# Patient Record
Sex: MALE | Race: WHITE | Employment: FULL TIME | URBAN - METROPOLITAN AREA
[De-identification: names, ages, dates, MRNs, and addresses within clinical notes are randomized per-mention and may not be internally consistent; named-entity substitution may affect disease eponyms.]

---

## 2018-02-25 PROBLEM — J02.8 ACUTE PHARYNGITIS DUE TO OTHER SPECIFIED ORGANISMS: Status: ACTIVE | Noted: 2018-02-25

## 2018-03-12 ENCOUNTER — TELEPHONE (OUTPATIENT)
Dept: ADMINISTRATIVE | Age: 48
End: 2018-03-12

## 2018-04-12 ENCOUNTER — OFFICE VISIT (OUTPATIENT)
Dept: FAMILY MEDICINE CLINIC | Age: 48
End: 2018-04-12
Payer: COMMERCIAL

## 2018-04-12 VITALS
WEIGHT: 257 LBS | HEIGHT: 67 IN | DIASTOLIC BLOOD PRESSURE: 78 MMHG | RESPIRATION RATE: 16 BRPM | BODY MASS INDEX: 40.34 KG/M2 | HEART RATE: 93 BPM | TEMPERATURE: 98.5 F | SYSTOLIC BLOOD PRESSURE: 134 MMHG | OXYGEN SATURATION: 95 %

## 2018-04-12 DIAGNOSIS — Z87.898 H/O MOTION SICKNESS: ICD-10-CM

## 2018-04-12 DIAGNOSIS — E66.01 MORBID OBESITY WITH BMI OF 40.0-44.9, ADULT (HCC): ICD-10-CM

## 2018-04-12 DIAGNOSIS — I10 ESSENTIAL HYPERTENSION: Primary | ICD-10-CM

## 2018-04-12 PROCEDURE — G8417 CALC BMI ABV UP PARAM F/U: HCPCS | Performed by: NURSE PRACTITIONER

## 2018-04-12 PROCEDURE — 96160 PT-FOCUSED HLTH RISK ASSMT: CPT | Performed by: NURSE PRACTITIONER

## 2018-04-12 PROCEDURE — G8427 DOCREV CUR MEDS BY ELIG CLIN: HCPCS | Performed by: NURSE PRACTITIONER

## 2018-04-12 PROCEDURE — 99214 OFFICE O/P EST MOD 30 MIN: CPT | Performed by: NURSE PRACTITIONER

## 2018-04-12 PROCEDURE — 1036F TOBACCO NON-USER: CPT | Performed by: NURSE PRACTITIONER

## 2018-04-12 RX ORDER — SCOLOPAMINE TRANSDERMAL SYSTEM 1 MG/1
1 PATCH, EXTENDED RELEASE TRANSDERMAL
Qty: 3 PATCH | Refills: 0 | Status: SHIPPED | OUTPATIENT
Start: 2018-04-12 | End: 2019-04-08

## 2018-04-12 ASSESSMENT — PATIENT HEALTH QUESTIONNAIRE - PHQ9
6. FEELING BAD ABOUT YOURSELF - OR THAT YOU ARE A FAILURE OR HAVE LET YOURSELF OR YOUR FAMILY DOWN: 0
SUM OF ALL RESPONSES TO PHQ9 QUESTIONS 1 & 2: 4
3. TROUBLE FALLING OR STAYING ASLEEP: 1
SUM OF ALL RESPONSES TO PHQ QUESTIONS 1-9: 7
8. MOVING OR SPEAKING SO SLOWLY THAT OTHER PEOPLE COULD HAVE NOTICED. OR THE OPPOSITE, BEING SO FIGETY OR RESTLESS THAT YOU HAVE BEEN MOVING AROUND A LOT MORE THAN USUAL: 0
1. LITTLE INTEREST OR PLEASURE IN DOING THINGS: 3
4. FEELING TIRED OR HAVING LITTLE ENERGY: 2
7. TROUBLE CONCENTRATING ON THINGS, SUCH AS READING THE NEWSPAPER OR WATCHING TELEVISION: 0
2. FEELING DOWN, DEPRESSED OR HOPELESS: 1
5. POOR APPETITE OR OVEREATING: 0
9. THOUGHTS THAT YOU WOULD BE BETTER OFF DEAD, OR OF HURTING YOURSELF: 0
10. IF YOU CHECKED OFF ANY PROBLEMS, HOW DIFFICULT HAVE THESE PROBLEMS MADE IT FOR YOU TO DO YOUR WORK, TAKE CARE OF THINGS AT HOME, OR GET ALONG WITH OTHER PEOPLE: 0

## 2018-04-13 ENCOUNTER — TELEPHONE (OUTPATIENT)
Dept: FAMILY MEDICINE CLINIC | Age: 48
End: 2018-04-13

## 2018-04-15 ASSESSMENT — ENCOUNTER SYMPTOMS
SHORTNESS OF BREATH: 0
VOMITING: 0
SINUS PAIN: 0
ABDOMINAL PAIN: 0
EYE REDNESS: 0
CHEST TIGHTNESS: 0
SORE THROAT: 0
BLOOD IN STOOL: 0
CONSTIPATION: 0
COUGH: 0
EYE DISCHARGE: 0
COLOR CHANGE: 0
FACIAL SWELLING: 0
NAUSEA: 1
WHEEZING: 0
SINUS PRESSURE: 0
VOICE CHANGE: 0
EYE PAIN: 0
BACK PAIN: 0
TROUBLE SWALLOWING: 0
STRIDOR: 0
CHOKING: 0
EYE ITCHING: 0
ANAL BLEEDING: 0
PHOTOPHOBIA: 0
RHINORRHEA: 0
APNEA: 0
ABDOMINAL DISTENTION: 0
DIARRHEA: 0
RECTAL PAIN: 0

## 2018-04-23 ENCOUNTER — TELEPHONE (OUTPATIENT)
Dept: FAMILY MEDICINE CLINIC | Age: 48
End: 2018-04-23

## 2018-05-14 ENCOUNTER — OFFICE VISIT (OUTPATIENT)
Dept: FAMILY MEDICINE CLINIC | Age: 48
End: 2018-05-14
Payer: COMMERCIAL

## 2018-05-14 VITALS
HEART RATE: 88 BPM | TEMPERATURE: 98.5 F | DIASTOLIC BLOOD PRESSURE: 80 MMHG | WEIGHT: 259 LBS | BODY MASS INDEX: 40.65 KG/M2 | SYSTOLIC BLOOD PRESSURE: 133 MMHG | HEIGHT: 67 IN | RESPIRATION RATE: 20 BRPM

## 2018-05-14 DIAGNOSIS — H92.01 OTALGIA, RIGHT: Primary | ICD-10-CM

## 2018-05-14 PROCEDURE — 1036F TOBACCO NON-USER: CPT | Performed by: NURSE PRACTITIONER

## 2018-05-14 PROCEDURE — G8427 DOCREV CUR MEDS BY ELIG CLIN: HCPCS | Performed by: NURSE PRACTITIONER

## 2018-05-14 PROCEDURE — 99213 OFFICE O/P EST LOW 20 MIN: CPT | Performed by: NURSE PRACTITIONER

## 2018-05-14 PROCEDURE — G8417 CALC BMI ABV UP PARAM F/U: HCPCS | Performed by: NURSE PRACTITIONER

## 2018-05-14 RX ORDER — PSEUDOEPHEDRINE HYDROCHLORIDE 30 MG/1
TABLET ORAL
Qty: 10 TABLET | Refills: 0 | Status: SHIPPED | OUTPATIENT
Start: 2018-05-14 | End: 2019-01-30 | Stop reason: SDUPTHER

## 2018-05-21 ENCOUNTER — OFFICE VISIT (OUTPATIENT)
Dept: FAMILY MEDICINE CLINIC | Age: 48
End: 2018-05-21
Payer: COMMERCIAL

## 2018-05-21 VITALS
RESPIRATION RATE: 16 BRPM | HEART RATE: 94 BPM | OXYGEN SATURATION: 95 % | HEIGHT: 67 IN | TEMPERATURE: 98.5 F | DIASTOLIC BLOOD PRESSURE: 88 MMHG | WEIGHT: 258 LBS | SYSTOLIC BLOOD PRESSURE: 133 MMHG | BODY MASS INDEX: 40.49 KG/M2

## 2018-05-21 DIAGNOSIS — H92.01 OTALGIA, RIGHT EAR: Primary | ICD-10-CM

## 2018-05-21 PROCEDURE — 1036F TOBACCO NON-USER: CPT | Performed by: NURSE PRACTITIONER

## 2018-05-21 PROCEDURE — G8417 CALC BMI ABV UP PARAM F/U: HCPCS | Performed by: NURSE PRACTITIONER

## 2018-05-21 PROCEDURE — G8427 DOCREV CUR MEDS BY ELIG CLIN: HCPCS | Performed by: NURSE PRACTITIONER

## 2018-05-21 PROCEDURE — 99213 OFFICE O/P EST LOW 20 MIN: CPT | Performed by: NURSE PRACTITIONER

## 2018-05-21 ASSESSMENT — ENCOUNTER SYMPTOMS
FACIAL SWELLING: 0
TROUBLE SWALLOWING: 0
WHEEZING: 0
RHINORRHEA: 0
SINUS PAIN: 0
COLOR CHANGE: 0
ABDOMINAL PAIN: 0
SORE THROAT: 0
WHEEZING: 0
PHOTOPHOBIA: 0
PHOTOPHOBIA: 0
SORE THROAT: 0
VOICE CHANGE: 0
ABDOMINAL PAIN: 0
EYE PAIN: 0
EYE ITCHING: 0
SHORTNESS OF BREATH: 0
FACIAL SWELLING: 0
EYE REDNESS: 0
SINUS PRESSURE: 0
SHORTNESS OF BREATH: 0
DIARRHEA: 0
SINUS PAIN: 0
EYE REDNESS: 0
SINUS PRESSURE: 0
VOMITING: 0
VOMITING: 0
TROUBLE SWALLOWING: 0
COUGH: 0
RHINORRHEA: 0
EYE ITCHING: 0
NAUSEA: 0
VOICE CHANGE: 0
NAUSEA: 0
DIARRHEA: 0
COUGH: 0
EYE PAIN: 0
COLOR CHANGE: 0

## 2018-06-13 ENCOUNTER — HOSPITAL ENCOUNTER (OUTPATIENT)
Age: 48
Discharge: HOME | End: 2018-06-13
Payer: COMMERCIAL

## 2018-06-13 DIAGNOSIS — E66.01 MORBID OBESITY WITH BMI OF 40.0-44.9, ADULT (HCC): ICD-10-CM

## 2018-06-13 DIAGNOSIS — I10 ESSENTIAL HYPERTENSION: ICD-10-CM

## 2018-06-13 LAB
ALBUMIN SERPL-MCNC: 4.1 G/DL (ref 3.5–5.2)
ALP BLD-CCNC: 99 U/L (ref 40–129)
ALT SERPL-CCNC: 20 U/L (ref 0–40)
ANION GAP SERPL CALCULATED.3IONS-SCNC: 13 MMOL/L (ref 7–16)
AST SERPL-CCNC: 19 U/L (ref 0–39)
BILIRUB SERPL-MCNC: 0.5 MG/DL (ref 0–1.2)
BUN BLDV-MCNC: 13 MG/DL (ref 6–20)
CALCIUM SERPL-MCNC: 9.6 MG/DL (ref 8.6–10.2)
CHLORIDE BLD-SCNC: 103 MMOL/L (ref 98–107)
CHOLESTEROL, TOTAL: 178 MG/DL (ref 0–199)
CO2: 28 MMOL/L (ref 22–29)
CREAT SERPL-MCNC: 0.8 MG/DL (ref 0.7–1.2)
GFR AFRICAN AMERICAN: >60
GFR NON-AFRICAN AMERICAN: >60 ML/MIN/1.73
GLUCOSE BLD-MCNC: 101 MG/DL (ref 74–109)
HCT VFR BLD CALC: 44.2 % (ref 37–54)
HDLC SERPL-MCNC: 40 MG/DL
HEMOGLOBIN: 14.9 G/DL (ref 12.5–16.5)
LDL CHOLESTEROL CALCULATED: 122 MG/DL (ref 0–99)
MCH RBC QN AUTO: 27.1 PG (ref 26–35)
MCHC RBC AUTO-ENTMCNC: 33.7 % (ref 32–34.5)
MCV RBC AUTO: 80.5 FL (ref 80–99.9)
PDW BLD-RTO: 13.1 FL (ref 11.5–15)
PLATELET # BLD: 302 E9/L (ref 130–450)
PMV BLD AUTO: 8.9 FL (ref 7–12)
POTASSIUM SERPL-SCNC: 3.7 MMOL/L (ref 3.5–5)
RBC # BLD: 5.49 E12/L (ref 3.8–5.8)
SODIUM BLD-SCNC: 144 MMOL/L (ref 132–146)
TOTAL PROTEIN: 7.5 G/DL (ref 6.4–8.3)
TRIGL SERPL-MCNC: 82 MG/DL (ref 0–149)
TSH SERPL DL<=0.05 MIU/L-ACNC: 4.63 UIU/ML (ref 0.27–4.2)
VLDLC SERPL CALC-MCNC: 16 MG/DL
WBC # BLD: 9.3 E9/L (ref 4.5–11.5)

## 2018-06-13 PROCEDURE — 80053 COMPREHEN METABOLIC PANEL: CPT

## 2018-06-13 PROCEDURE — 85027 COMPLETE CBC AUTOMATED: CPT

## 2018-06-13 PROCEDURE — 84443 ASSAY THYROID STIM HORMONE: CPT

## 2018-06-13 PROCEDURE — 36415 COLL VENOUS BLD VENIPUNCTURE: CPT

## 2018-06-13 PROCEDURE — 80061 LIPID PANEL: CPT

## 2018-06-18 ENCOUNTER — TELEPHONE (OUTPATIENT)
Dept: FAMILY MEDICINE CLINIC | Age: 48
End: 2018-06-18

## 2018-06-25 DIAGNOSIS — R79.89 ABNORMAL THYROID BLOOD TEST: Primary | ICD-10-CM

## 2018-08-06 ENCOUNTER — HOSPITAL ENCOUNTER (OUTPATIENT)
Age: 48
Discharge: HOME | End: 2018-08-06
Payer: COMMERCIAL

## 2018-08-06 DIAGNOSIS — R79.89 ABNORMAL THYROID BLOOD TEST: ICD-10-CM

## 2018-08-06 LAB — TSH SERPL DL<=0.05 MIU/L-ACNC: 4.37 UIU/ML (ref 0.27–4.2)

## 2018-08-06 PROCEDURE — 84443 ASSAY THYROID STIM HORMONE: CPT

## 2018-08-06 PROCEDURE — 36415 COLL VENOUS BLD VENIPUNCTURE: CPT

## 2018-08-07 RX ORDER — LEVOTHYROXINE SODIUM 0.03 MG/1
25 TABLET ORAL DAILY
Qty: 30 TABLET | Refills: 0 | Status: SHIPPED | OUTPATIENT
Start: 2018-08-07 | End: 2018-08-29 | Stop reason: SDUPTHER

## 2018-08-30 RX ORDER — LEVOTHYROXINE SODIUM 0.03 MG/1
25 TABLET ORAL DAILY
Qty: 30 TABLET | Refills: 0 | Status: SHIPPED | OUTPATIENT
Start: 2018-08-30 | End: 2018-09-11 | Stop reason: SDUPTHER

## 2018-09-10 ENCOUNTER — HOSPITAL ENCOUNTER (OUTPATIENT)
Age: 48
Discharge: HOME | End: 2018-09-12
Payer: COMMERCIAL

## 2018-09-10 ENCOUNTER — OFFICE VISIT (OUTPATIENT)
Dept: FAMILY MEDICINE CLINIC | Age: 48
End: 2018-09-10
Payer: COMMERCIAL

## 2018-09-10 VITALS
OXYGEN SATURATION: 97 % | BODY MASS INDEX: 40.97 KG/M2 | SYSTOLIC BLOOD PRESSURE: 138 MMHG | RESPIRATION RATE: 14 BRPM | HEART RATE: 84 BPM | DIASTOLIC BLOOD PRESSURE: 84 MMHG | TEMPERATURE: 98.7 F | HEIGHT: 67 IN | WEIGHT: 261 LBS

## 2018-09-10 DIAGNOSIS — I10 ESSENTIAL HYPERTENSION: Primary | ICD-10-CM

## 2018-09-10 DIAGNOSIS — E03.9 HYPOTHYROIDISM, UNSPECIFIED TYPE: ICD-10-CM

## 2018-09-10 PROBLEM — R79.89 ABNORMAL THYROID BLOOD TEST: Status: RESOLVED | Noted: 2018-06-25 | Resolved: 2018-09-10

## 2018-09-10 PROBLEM — J02.8 ACUTE PHARYNGITIS DUE TO OTHER SPECIFIED ORGANISMS: Status: RESOLVED | Noted: 2018-02-25 | Resolved: 2018-09-10

## 2018-09-10 LAB — TSH SERPL DL<=0.05 MIU/L-ACNC: 2.26 UIU/ML (ref 0.27–4.2)

## 2018-09-10 PROCEDURE — G8427 DOCREV CUR MEDS BY ELIG CLIN: HCPCS | Performed by: NURSE PRACTITIONER

## 2018-09-10 PROCEDURE — G8417 CALC BMI ABV UP PARAM F/U: HCPCS | Performed by: NURSE PRACTITIONER

## 2018-09-10 PROCEDURE — 84443 ASSAY THYROID STIM HORMONE: CPT

## 2018-09-10 PROCEDURE — 99214 OFFICE O/P EST MOD 30 MIN: CPT | Performed by: NURSE PRACTITIONER

## 2018-09-10 PROCEDURE — 1036F TOBACCO NON-USER: CPT | Performed by: NURSE PRACTITIONER

## 2018-09-10 ASSESSMENT — ENCOUNTER SYMPTOMS
COLOR CHANGE: 0
CONSTIPATION: 0
VOMITING: 0
TROUBLE SWALLOWING: 0
VOICE CHANGE: 0
ABDOMINAL DISTENTION: 0
DIARRHEA: 0
ABDOMINAL PAIN: 0
NAUSEA: 0
SHORTNESS OF BREATH: 0
EYE PAIN: 0
WHEEZING: 0
CHEST TIGHTNESS: 0
COUGH: 0
BLOOD IN STOOL: 0
PHOTOPHOBIA: 0

## 2018-09-10 NOTE — PATIENT INSTRUCTIONS
Take your usual dose the next day. · Tell your doctor about all prescription, herbal, or over-the-counter products you take. · Take care of yourself. Eat a healthy diet, get enough sleep, and get regular exercise. When should you call for help? Call 911 anytime you think you may need emergency care. For example, call if:    · You passed out (lost consciousness).     · You have severe trouble breathing.     · You have a very slow heartbeat (less than 60 beats a minute).     · You have a low body temperature (95°F or below).    Call your doctor now or seek immediate medical care if:    · You feel tired, sluggish, or weak.     · You have trouble remembering things or concentrating.     · You do not begin to feel better 2 weeks after starting your medicine.    Watch closely for changes in your health, and be sure to contact your doctor if you have any problems. Where can you learn more? Go to https://[employer].iCracked. org and sign in to your [employer] account. Enter P544 in the myBarrister box to learn more about \"Hypothyroidism: Care Instructions. \"     If you do not have an account, please click on the \"Sign Up Now\" link. Current as of: May 12, 2017  Content Version: 11.7  © 2940-7115 [employer]. Care instructions adapted under license by [facility] ([facility]). If you have questions about a medical condition or this instruction, always ask your healthcare professional. [place] [address] any warranty or liability for your use of this information.

## 2018-09-11 RX ORDER — LEVOTHYROXINE SODIUM 0.03 MG/1
25 TABLET ORAL DAILY
Qty: 30 TABLET | Refills: 5 | Status: SHIPPED | OUTPATIENT
Start: 2018-09-11 | End: 2019-04-01 | Stop reason: SDUPTHER

## 2018-12-10 DIAGNOSIS — I10 ESSENTIAL HYPERTENSION: ICD-10-CM

## 2018-12-10 RX ORDER — AMLODIPINE BESYLATE 5 MG/1
TABLET ORAL
Qty: 30 TABLET | Refills: 0 | Status: SHIPPED | OUTPATIENT
Start: 2018-12-10 | End: 2019-01-16

## 2019-01-30 DIAGNOSIS — H92.01 OTALGIA, RIGHT: ICD-10-CM

## 2019-01-30 RX ORDER — PSEUDOEPHEDRINE HYDROCHLORIDE 30 MG/1
TABLET ORAL
Qty: 30 TABLET | Refills: 3 | Status: SHIPPED | OUTPATIENT
Start: 2019-01-30 | End: 2019-04-26 | Stop reason: ALTCHOICE

## 2019-04-02 RX ORDER — LEVOTHYROXINE SODIUM 0.03 MG/1
25 TABLET ORAL DAILY
Qty: 30 TABLET | Refills: 5 | Status: SHIPPED | OUTPATIENT
Start: 2019-04-02 | End: 2019-05-31 | Stop reason: SDUPTHER

## 2019-04-08 ENCOUNTER — HOSPITAL ENCOUNTER (OUTPATIENT)
Age: 49
Discharge: HOME | End: 2019-04-08
Payer: COMMERCIAL

## 2019-04-08 ENCOUNTER — OFFICE VISIT (OUTPATIENT)
Dept: FAMILY MEDICINE CLINIC | Age: 49
End: 2019-04-08
Payer: COMMERCIAL

## 2019-04-08 VITALS
OXYGEN SATURATION: 97 % | TEMPERATURE: 98.4 F | HEIGHT: 67 IN | RESPIRATION RATE: 16 BRPM | SYSTOLIC BLOOD PRESSURE: 134 MMHG | DIASTOLIC BLOOD PRESSURE: 86 MMHG | WEIGHT: 262 LBS | HEART RATE: 80 BPM | BODY MASS INDEX: 41.12 KG/M2

## 2019-04-08 DIAGNOSIS — Z80.42 FAMILY HISTORY OF PROSTATE CANCER IN FATHER: ICD-10-CM

## 2019-04-08 DIAGNOSIS — R07.9 NONSPECIFIC CHEST PAIN: ICD-10-CM

## 2019-04-08 DIAGNOSIS — E03.9 HYPOTHYROIDISM, UNSPECIFIED TYPE: ICD-10-CM

## 2019-04-08 DIAGNOSIS — R10.13 EPIGASTRIC PAIN: ICD-10-CM

## 2019-04-08 DIAGNOSIS — I10 ESSENTIAL HYPERTENSION: ICD-10-CM

## 2019-04-08 LAB
ALBUMIN SERPL-MCNC: 4.3 G/DL (ref 3.5–5.2)
ALP BLD-CCNC: 136 U/L (ref 40–129)
ALT SERPL-CCNC: 143 U/L (ref 0–40)
AMYLASE: 38 U/L (ref 20–100)
ANION GAP SERPL CALCULATED.3IONS-SCNC: 13 MMOL/L (ref 7–16)
AST SERPL-CCNC: 65 U/L (ref 0–39)
BASOPHILS ABSOLUTE: 0.1 E9/L (ref 0–0.2)
BASOPHILS RELATIVE PERCENT: 1.2 % (ref 0–2)
BILIRUB SERPL-MCNC: 0.5 MG/DL (ref 0–1.2)
BUN BLDV-MCNC: 10 MG/DL (ref 6–20)
CALCIUM SERPL-MCNC: 9.4 MG/DL (ref 8.6–10.2)
CHLORIDE BLD-SCNC: 103 MMOL/L (ref 98–107)
CHOLESTEROL, TOTAL: 177 MG/DL (ref 0–199)
CO2: 28 MMOL/L (ref 22–29)
CREAT SERPL-MCNC: 0.8 MG/DL (ref 0.7–1.2)
EOSINOPHILS ABSOLUTE: 0.46 E9/L (ref 0.05–0.5)
EOSINOPHILS RELATIVE PERCENT: 5.7 % (ref 0–6)
GFR AFRICAN AMERICAN: >60
GFR NON-AFRICAN AMERICAN: >60 ML/MIN/1.73
GLUCOSE BLD-MCNC: 100 MG/DL (ref 74–99)
HCT VFR BLD CALC: 46 % (ref 37–54)
HDLC SERPL-MCNC: 46 MG/DL
HEMOGLOBIN: 15.3 G/DL (ref 12.5–16.5)
IMMATURE GRANULOCYTES #: 0.04 E9/L
IMMATURE GRANULOCYTES %: 0.5 % (ref 0–5)
LDL CHOLESTEROL CALCULATED: 110 MG/DL (ref 0–99)
LIPASE: 54 U/L (ref 13–60)
LYMPHOCYTES ABSOLUTE: 2.45 E9/L (ref 1.5–4)
LYMPHOCYTES RELATIVE PERCENT: 30.2 % (ref 20–42)
MCH RBC QN AUTO: 27.4 PG (ref 26–35)
MCHC RBC AUTO-ENTMCNC: 33.3 % (ref 32–34.5)
MCV RBC AUTO: 82.3 FL (ref 80–99.9)
MONOCYTES ABSOLUTE: 0.45 E9/L (ref 0.1–0.95)
MONOCYTES RELATIVE PERCENT: 5.5 % (ref 2–12)
NEUTROPHILS ABSOLUTE: 4.62 E9/L (ref 1.8–7.3)
NEUTROPHILS RELATIVE PERCENT: 56.9 % (ref 43–80)
PDW BLD-RTO: 13.7 FL (ref 11.5–15)
PLATELET # BLD: 289 E9/L (ref 130–450)
PMV BLD AUTO: 8.9 FL (ref 7–12)
POTASSIUM SERPL-SCNC: 3.4 MMOL/L (ref 3.5–5)
PROSTATE SPECIFIC ANTIGEN: 0.43 NG/ML (ref 0–4)
RBC # BLD: 5.59 E12/L (ref 3.8–5.8)
SODIUM BLD-SCNC: 144 MMOL/L (ref 132–146)
TOTAL CK: 94 U/L (ref 20–200)
TOTAL PROTEIN: 7.9 G/DL (ref 6.4–8.3)
TRIGL SERPL-MCNC: 104 MG/DL (ref 0–149)
TROPONIN: <0.01 NG/ML (ref 0–0.03)
TSH SERPL DL<=0.05 MIU/L-ACNC: 4.27 UIU/ML (ref 0.27–4.2)
VLDLC SERPL CALC-MCNC: 21 MG/DL
WBC # BLD: 8.1 E9/L (ref 4.5–11.5)

## 2019-04-08 PROCEDURE — G8417 CALC BMI ABV UP PARAM F/U: HCPCS | Performed by: NURSE PRACTITIONER

## 2019-04-08 PROCEDURE — 36415 COLL VENOUS BLD VENIPUNCTURE: CPT

## 2019-04-08 PROCEDURE — G0103 PSA SCREENING: HCPCS

## 2019-04-08 PROCEDURE — 83690 ASSAY OF LIPASE: CPT

## 2019-04-08 PROCEDURE — 84443 ASSAY THYROID STIM HORMONE: CPT

## 2019-04-08 PROCEDURE — 93000 ELECTROCARDIOGRAM COMPLETE: CPT | Performed by: NURSE PRACTITIONER

## 2019-04-08 PROCEDURE — 82550 ASSAY OF CK (CPK): CPT

## 2019-04-08 PROCEDURE — 85025 COMPLETE CBC W/AUTO DIFF WBC: CPT

## 2019-04-08 PROCEDURE — 99214 OFFICE O/P EST MOD 30 MIN: CPT | Performed by: NURSE PRACTITIONER

## 2019-04-08 PROCEDURE — 1036F TOBACCO NON-USER: CPT | Performed by: NURSE PRACTITIONER

## 2019-04-08 PROCEDURE — 84484 ASSAY OF TROPONIN QUANT: CPT

## 2019-04-08 PROCEDURE — G8427 DOCREV CUR MEDS BY ELIG CLIN: HCPCS | Performed by: NURSE PRACTITIONER

## 2019-04-08 PROCEDURE — 80061 LIPID PANEL: CPT

## 2019-04-08 PROCEDURE — 80053 COMPREHEN METABOLIC PANEL: CPT

## 2019-04-08 PROCEDURE — 82150 ASSAY OF AMYLASE: CPT

## 2019-04-08 ASSESSMENT — ENCOUNTER SYMPTOMS
WHEEZING: 0
DIARRHEA: 0
COUGH: 0
EYE ITCHING: 0
EYE PAIN: 0
CHOKING: 0
CHEST TIGHTNESS: 0
APNEA: 0
VOMITING: 0
NAUSEA: 0
EYE REDNESS: 0
TROUBLE SWALLOWING: 0
ABDOMINAL DISTENTION: 0
ABDOMINAL PAIN: 1
SINUS PAIN: 0
VOICE CHANGE: 0
COLOR CHANGE: 0
STRIDOR: 0
SINUS PRESSURE: 0
BACK PAIN: 0
RHINORRHEA: 0
SHORTNESS OF BREATH: 0
BLOOD IN STOOL: 0
PHOTOPHOBIA: 0
SORE THROAT: 0
CONSTIPATION: 0
EYE DISCHARGE: 0

## 2019-04-08 ASSESSMENT — PATIENT HEALTH QUESTIONNAIRE - PHQ9
SUM OF ALL RESPONSES TO PHQ9 QUESTIONS 1 & 2: 0
SUM OF ALL RESPONSES TO PHQ QUESTIONS 1-9: 0
1. LITTLE INTEREST OR PLEASURE IN DOING THINGS: 0
SUM OF ALL RESPONSES TO PHQ QUESTIONS 1-9: 0
2. FEELING DOWN, DEPRESSED OR HOPELESS: 0

## 2019-04-08 NOTE — PROGRESS NOTES
Deepali Cormier is a 53 y.o. male who presents today for   Chief Complaint   Patient presents with    Abdominal Pain     over the weekend, has subsided now     Chest Pain     resolved    Hypertension    Hypothyroidism         HPI    Abdominal/Chest Pain  Pt presents today with c/o epigastric pain/pressure and episode of chest pain that started over the weekend. Pt denies any radiating pain or N/V/D, bloody/tarry stools, abnormal weight loss, dysphagia. Pt is tolerating PO well and is afebrile. Pt feels he may of had gastritis, advised pt will need EKG and labs today. Pt stated he was not doing any physical activity when pain started. He does not recall eating any fried/spicy or fatty foods, denies h/o gallbladder issues    Hypertension  Patient is here for follow-up of elevated blood pressure. He is exercising and is adherent to a low-salt diet. Blood pressure is well controlled at home. Cardiac symptoms: chest pain. Patient denies chest pressure/discomfort, claudication, dyspnea, exertional chest pressure/discomfort, fatigue, irregular heart beat, lower extremity edema, near-syncope, orthopnea, palpitations, paroxysmal nocturnal dyspnea and syncope. Cardiovascular risk factors: hypertension, male gender and obesity (BMI >= 30 kg/m2). Use of agents associated with hypertension: thyroid hormones. Hypothyroidism: Recent symptoms: none. He denies weight gain, weight loss, cold intolerance, heat intolerance, anxiety, tremor, palpitations and dysphagia. Patient is  taking his medication consistently on an empty stomach. Lab Results   Component Value Date    TSH 2.260 09/10/2018    TSH 4.370 (H) 08/06/2018    TSH 4.630 (H) 06/13/2018     Pt is due for Preventative labs today    [facility]:  Patient's past medical, surgical, social and/or family history reviewed, updated in chart, and are non-contributory (unless otherwise stated). Medications and allergies also reviewed and updated in chart.       Review of Systems  Review of Systems   Constitutional: Negative for activity change, appetite change, chills, diaphoresis, fatigue, fever and unexpected weight change. HENT: Negative for congestion, ear discharge, ear pain, hearing loss, mouth sores, nosebleeds, postnasal drip, rhinorrhea, sinus pressure, sinus pain, sneezing, sore throat, tinnitus, trouble swallowing and voice change. Eyes: Negative for photophobia, pain, discharge, redness, itching and visual disturbance. Respiratory: Negative for apnea, cough, choking, chest tightness, shortness of breath, wheezing and stridor. Cardiovascular: Positive for chest pain. Negative for palpitations and leg swelling. Gastrointestinal: Positive for abdominal pain (epigastric ). Negative for abdominal distention, blood in stool, constipation, diarrhea, nausea and vomiting. Endocrine: Negative for cold intolerance, heat intolerance, polydipsia, polyphagia and polyuria. Genitourinary: Negative for decreased urine volume, difficulty urinating, enuresis, flank pain, frequency, hematuria and urgency. Musculoskeletal: Negative for arthralgias, back pain, joint swelling and myalgias. Skin: Negative for color change, pallor and rash. Neurological: Negative for dizziness, tremors, seizures, syncope, facial asymmetry, speech difficulty, weakness, light-headedness, numbness and headaches. Hematological: Negative for adenopathy. Does not bruise/bleed easily. Physical Exam:    VS:  Pulse 80   Temp 98.4 °F (36.9 °C) (Oral)   Resp 16   Ht 5' 7\" (1.702 m)   Wt 262 lb (118.8 kg)   SpO2 97%   BMI 41.04 kg/m²   LAST WEIGHT:  Wt Readings from Last 3 Encounters:   04/08/19 262 lb (118.8 kg)   09/10/18 261 lb (118.4 kg)   05/21/18 258 lb (117 kg)     Physical Exam   Constitutional: He is oriented to person, place, and time. He appears well-developed and well-nourished. No distress. HENT:   Head: Normocephalic and atraumatic.    Right Ear: External ear normal.   Left Ear: External ear normal.   Nose: Nose normal.   Mouth/Throat: Oropharynx is clear and moist. No oropharyngeal exudate. Eyes: Pupils are equal, round, and reactive to light. Conjunctivae and EOM are normal. Right eye exhibits no discharge. Left eye exhibits no discharge. Neck: Normal range of motion. Neck supple. No JVD present. No thyromegaly present. Cardiovascular: Normal rate, regular rhythm, normal heart sounds and intact distal pulses. Exam reveals no friction rub. No murmur heard. No peripheral edema   Pulmonary/Chest: Effort normal and breath sounds normal. No stridor. No respiratory distress. He has no wheezes. He has no rales. He exhibits no tenderness. Pulse ox 97%   Abdominal: Soft. Bowel sounds are normal. He exhibits no distension and no mass. There is tenderness (mild epigastric pain). There is no rebound and no guarding. No hernia. Musculoskeletal: Normal range of motion. He exhibits no edema, tenderness or deformity. Lymphadenopathy:     He has no cervical adenopathy. Neurological: He is alert and oriented to person, place, and time. He displays normal reflexes. No cranial nerve deficit or sensory deficit. He exhibits normal muscle tone. Coordination normal.   Skin: Skin is warm and dry. No rash noted. He is not diaphoretic. No erythema. No pallor. Nursing note and vitals reviewed. Assessment / Plan:      Tyra Méndez was seen today for abdominal pain, chest pain, hypertension and hypothyroidism. Diagnoses and all orders for this visit:    Essential hypertension-stable/controlled  Continue current treatment plan  -     CBC Auto Differential; Future  -     Comprehensive Metabolic Panel; Future  -     TSH without Reflex; Future  -     Lipid Panel; Future    Hypothyroidism, unspecified type  Continue current dosage of Synthroid  -     CBC Auto Differential; Future  -     Comprehensive Metabolic Panel; Future  -     TSH without Reflex;  Future  -     Lipid Panel; Future    Epigastric pain  Further treatment plan will be based on labs once received  -     EKG 12 Lead  -     CBC Auto Differential; Future  -     Comprehensive Metabolic Panel; Future  -     AMYLASE; Future  -     LIPASE; Future    Nonspecific chest pain  Advised to have labs completed today  Advised to go to ED immediately with any worsening chest pain as discussed  -     EKG 12 Lead  -     TROPONIN I; Future  -     CK; Future    Family history of prostate cancer in father  -     PSA SCREENING; Future         Call or go to ED immediately if symptoms worsen or persist.    Return for f/u will be based on diagnostic testing., or sooner if necessary. Educational materials and/or home exercises printed for patient's review and were included in patient instructions on his/her After Visit Summary and given to patient at the end of visit. Counseled regarding above diagnosis, including possible risks and complications,  especially if left uncontrolled. Counseled regarding the possible side effects, risks, benefits and alternatives to treatment; patient and/or guardian verbalizes understanding, agrees, feels comfortable with and wishes to proceed with above treatment plan. Advised patient to call with any new medication issues, and read all Rx info from pharmacy to assure aware of all possible risks and side effects of medication before taking. Reviewed age and gender appropriate health screening exams and vaccinations. Advised patient regarding importance of keeping up with recommended health maintenance and to schedule as soon as possible if overdue, as this is important in assessing for undiagnosed pathology, especially cancer, as well as protecting against potentially harmful/life threatening disease. Patient and/or guardian verbalizes understanding and agrees with above counseling, assessment and plan. All questions answered.     Samantha Eaton APRN - CNP

## 2019-04-09 ENCOUNTER — TELEPHONE (OUTPATIENT)
Dept: FAMILY MEDICINE CLINIC | Age: 49
End: 2019-04-09

## 2019-04-09 DIAGNOSIS — R74.8 ELEVATED LIVER ENZYMES: ICD-10-CM

## 2019-04-09 DIAGNOSIS — E03.9 HYPOTHYROIDISM, UNSPECIFIED TYPE: Primary | ICD-10-CM

## 2019-04-09 NOTE — TELEPHONE ENCOUNTER
Spoke to patient in detail regarding his recent lab results, advised cardiac enzymes were normal, slightly elevated TSH and liver enzymes which can be elevated from a possible recent viral infection-gastroenteritis. Patient is agreeable to have US of gallbladder done at this time and will have repeat blood work done in two weeks for liver function and TSH. Advised he should go to [facility] for lab work and to wait for call regarding scheduling of US of gallbladder.

## 2019-04-11 DIAGNOSIS — R10.11 RIGHT UPPER QUADRANT PAIN: Primary | ICD-10-CM

## 2019-04-16 ENCOUNTER — HOSPITAL ENCOUNTER (OUTPATIENT)
Dept: ULTRASOUND IMAGING | Age: 49
Discharge: HOME | End: 2019-04-18
Payer: COMMERCIAL

## 2019-04-16 DIAGNOSIS — R10.11 RIGHT UPPER QUADRANT PAIN: ICD-10-CM

## 2019-04-16 DIAGNOSIS — R10.11 RIGHT UPPER QUADRANT PAIN: Primary | ICD-10-CM

## 2019-04-16 DIAGNOSIS — K80.20 GALLSTONES: ICD-10-CM

## 2019-04-16 PROCEDURE — 76705 ECHO EXAM OF ABDOMEN: CPT

## 2019-04-17 ENCOUNTER — TELEPHONE (OUTPATIENT)
Dept: ADMINISTRATIVE | Age: 49
End: 2019-04-17

## 2019-04-17 NOTE — TELEPHONE ENCOUNTER
Spoke with the patient on the phone. The patient can come to [facility] office on 4/25/19. Scheduled the patient for the office visit on 4/25/19 at 9:45am, bring ID, medication list, insurance card and co-pay. Gave the direction to the clinic. The patient verbalized understanding. MA then tentatively scheduled lap viral on 4/29/19 at 10:00am in [facility]. Spoke with KURT Garcia RN. Told Radha to call the patient after 4/25/19 afternoon. Jules Esqueda verbalized understanding. Forwarded message to Dr. Sonia Morin for informational purposes.   Electronically signed by Ronnell Quan on 4/17/19 at 11:17 AM

## 2019-04-24 ENCOUNTER — PREP FOR PROCEDURE (OUTPATIENT)
Dept: SURGERY | Age: 49
End: 2019-04-24

## 2019-04-24 RX ORDER — SODIUM CHLORIDE 0.9 % (FLUSH) 0.9 %
10 SYRINGE (ML) INJECTION PRN
Status: CANCELLED | OUTPATIENT
Start: 2019-04-24

## 2019-04-24 RX ORDER — SODIUM CHLORIDE 0.9 % (FLUSH) 0.9 %
10 SYRINGE (ML) INJECTION EVERY 12 HOURS SCHEDULED
Status: CANCELLED | OUTPATIENT
Start: 2019-04-24

## 2019-04-25 ENCOUNTER — TELEPHONE (OUTPATIENT)
Dept: SURGERY | Age: 49
End: 2019-04-25

## 2019-04-25 ENCOUNTER — OFFICE VISIT (OUTPATIENT)
Dept: SURGERY | Age: 49
End: 2019-04-25
Payer: COMMERCIAL

## 2019-04-25 VITALS
BODY MASS INDEX: 41.28 KG/M2 | HEART RATE: 86 BPM | TEMPERATURE: 98.2 F | SYSTOLIC BLOOD PRESSURE: 167 MMHG | RESPIRATION RATE: 18 BRPM | WEIGHT: 263 LBS | DIASTOLIC BLOOD PRESSURE: 103 MMHG | HEIGHT: 67 IN

## 2019-04-25 DIAGNOSIS — K80.20 CALCULUS OF GALLBLADDER WITHOUT CHOLECYSTITIS WITHOUT OBSTRUCTION: Primary | ICD-10-CM

## 2019-04-25 PROCEDURE — 99203 OFFICE O/P NEW LOW 30 MIN: CPT | Performed by: SURGERY

## 2019-04-25 NOTE — TELEPHONE ENCOUNTER
Spoke with [facility] representative [address] [facility]. Per [facility] [facility], [facility] [place] does not require the prior authorization. Tong Tafoya stated the patient's insurance will be  12 am on 19. Attempted to call the patient, no answer, left message on the phone.   Electronically signed by Adilson Nelson on 19 at 3:40 PM

## 2019-04-25 NOTE — PROGRESS NOTES
[facility] Surgery   History and Physical    Patient's Name/Date of Birth: Keila Ttaum / 1970 (43 y.o.)    Date: April 25, 2019     PCP: JOHN PAUL Iqbal CNP    Chief Complaint:   Chief Complaint   Patient presents with    Cholelithiasis       History of Present Illness: RUQ pain acute with radiation to back, associated with PO intake, recently began having sx. Non smoker some ETOH, denies others. No previous surgical hx. Past Medical History:   Diagnosis Date    Hypertension        Past Surgical History:   Procedure Laterality Date    WISDOM TOOTH EXTRACTION         Family History   Problem Relation Age of Onset    Cancer Father         Prostate    Stroke Maternal Grandfather     Cancer Paternal Uncle         Prostate    Cancer Paternal Uncle         Prostate       Social History     Socioeconomic History    Marital status:      Spouse name: Not on file    Number of children: Not on file    Years of education: Not on file    Highest education level: Not on file   Occupational History    Not on file   Social Needs    Financial resource strain: Not on file    Food insecurity:     Worry: Not on file     Inability: Not on file    Transportation needs:     Medical: Not on file     Non-medical: Not on file   Tobacco Use    Smoking status: Never Smoker    Smokeless tobacco: Never Used   Substance and Sexual Activity    Alcohol use:  Yes     Alcohol/week: 0.0 oz     Comment: Rarely    Drug use: No    Sexual activity: Yes     Partners: Female   Lifestyle    Physical activity:     Days per week: Not on file     Minutes per session: Not on file    Stress: Not on file   Relationships    Social connections:     Talks on phone: Not on file     Gets together: Not on file     Attends Yazidism service: Not on file     Active member of club or organization: Not on file     Attends meetings of clubs or organizations: Not on file     Relationship status: Not on file    Intimate benefits and alternatives of surgery including risk of death, CBD injury and bleeding. He takes a baby aspirin, which was stopped today. - Continue cardiac meds and synthroid on day of surgery     There are no diagnoses linked to this encounter.     Electronically Signed by Maribell Saini MD on 4/25/2019 at 10:39 AM

## 2019-04-26 ENCOUNTER — TELEPHONE (OUTPATIENT)
Dept: SURGERY | Age: 49
End: 2019-04-26

## 2019-04-26 RX ORDER — PHENOL 1.4 %
2 AEROSOL, SPRAY (ML) MUCOUS MEMBRANE DAILY
COMMUNITY

## 2019-04-26 NOTE — PROGRESS NOTES
Ric [facility] PRE-ADMISSION TESTING GENERAL INSTRUCTIONS- [facility]-phone number:[phone]    GENERAL INSTRUCTIONS  [x] Antibacterial Soap shower Night before and/or AM of Surgery  [] Preet wipe instruction sheet and wipes given. [x] Nothing by mouth after midnight, including gum, candy, mints, or water. [x] You may brush your teeth, gargle, but do NOT swallow water. []Hibiclens shower  the night before and the morning of surgery. Do not use             Hibiclens on your face or head. []No smoking, chewing tobacco, illegal drugs, or alcohol within 24 hours of your surgery. [x] Jewelry, valuables or body piercing's should not be brought to the hospital. All body and/or tongue piercing's must be removed prior to arriving to hospital.  ALL hair pins must be removed. [x] Do not wear makeup, lotions, powders, deodorant. Nail polish as directed by the nurse. [x] Arrange transportation with a responsible adult  to and from the hospital. If you do not have a responsible adult  to transport you, you will need to make arrangements with a medical transportation company (i.e. [employer]. A Uber/taxi/bus is not appropriate unless you are accompanied by a responsible adult ). Arrange for someone to be with you for the remainder of the day and for 24 hours after your procedure due to having had anesthesia. Who will be your  for transportation?______WIFE____________   Who will be staying with you for 24 hrs after your procedure?_____WIFE____________  [x] Bring insurance card and photo ID.  [] Transfusion Bracelet: Please bring with you to hospital, day of surgery  [] Bring urine specimen day of surgery. Any small container is acceptable. [] Use inhalers the morning of surgery and bring with you to hospital.  [] Bring copy of living will or healthcare power of  papers to be placed in your electronic record.   [] CPAP/BI-PAP: Please bring your machine if you are to spend the night in the hospital.     PARKING INSTRUCTIONS:   [x] Arrival Time:_______0800______  · [x] Parking lot '\"I\"  is located on [facility] (the corner of [facility] and [facility]). To enter, press the button and the gate will lift. A free token will be provided to exit the lot. One car per patient is allowed to park in this lot. All other cars are to park on [address] [place] [address] either in the parking garage or the handicap lot. [] To reach the [facility] lobby from [address] [place] [address], upon entering the hospital, take elevator B to the 3rd floor. EDUCATION INSTRUCTIONS:      [] Knee or hip replacement booklet & exercise pamphlets given. [] Merle 77 placed in chart. [] Pre-admission Testing educational folder given  [] Incentive Spirometry,coughing & deep breathing exercises reviewed. []Medication information sheet(s)   []Fluoroscopy-Xray used in surgery reviewed with patient. Educational pamphlet placed in chart. []Pain: Post-op pain is normal and to be expected. You will be asked to rate your pain from 0-10(a zero is not acceptable-education is needed). Your post-op pain goal is:  [] Ask your nurse for your pain medication. [] Joint camp offered. [] Joint replacement booklets given. [] Other:___________________________    MEDICATION INSTRUCTIONS:   [x]Bring a complete list of your medications, please write the last time you took the medicine, give this list to the nurse. [x] Take the following medications the morning of surgery with 1-2 ounces of water: SEE MED SHEET  [x] Stop herbal supplements and vitamins 5 days before your surgery. [] DO NOT take any diabetic medicine the morning of surgery. Follow instructions for insulin the day before surgery. [] If you are diabetic and your blood sugar is low or you feel symptomatic, you may drink 1-2 ounces of apple juice or take a glucose tablet.   The morning of your procedure, you may call the

## 2019-04-26 NOTE — TELEPHONE ENCOUNTER
Rescheduled the lap viral on 4/30/19 at 10:00am, arrival time is at 8:00am in [facility]. NPO after the midnight, hold ASA products until the procedure date. [facility] outpatient pharmacy is opened from 7am-6pm on that day, the patient's wife can bring the prescription there. Per the pharmacy, they take [employer] and they should be able to dispense the prescription. Attempted to call the patient, no answer, left message on the phone.   Electronically signed by Jennifer Poe on 4/26/2019 at 8:31 AM

## 2019-04-30 ENCOUNTER — HOSPITAL ENCOUNTER (OUTPATIENT)
Age: 49
Setting detail: OUTPATIENT SURGERY
Discharge: HOME | End: 2019-04-30
Attending: SURGERY | Admitting: SURGERY
Payer: COMMERCIAL

## 2019-04-30 ENCOUNTER — ANESTHESIA EVENT (OUTPATIENT)
Dept: OPERATING ROOM | Age: 49
End: 2019-04-30
Payer: COMMERCIAL

## 2019-04-30 ENCOUNTER — ANESTHESIA (OUTPATIENT)
Dept: OPERATING ROOM | Age: 49
End: 2019-04-30
Payer: COMMERCIAL

## 2019-04-30 VITALS — SYSTOLIC BLOOD PRESSURE: 118 MMHG | OXYGEN SATURATION: 98 % | TEMPERATURE: 98.6 F | DIASTOLIC BLOOD PRESSURE: 86 MMHG

## 2019-04-30 VITALS
WEIGHT: 263 LBS | HEIGHT: 67 IN | SYSTOLIC BLOOD PRESSURE: 138 MMHG | TEMPERATURE: 97.8 F | BODY MASS INDEX: 41.28 KG/M2 | HEART RATE: 80 BPM | OXYGEN SATURATION: 99 % | RESPIRATION RATE: 16 BRPM | DIASTOLIC BLOOD PRESSURE: 80 MMHG

## 2019-04-30 DIAGNOSIS — K80.20 CALCULUS OF GALLBLADDER WITHOUT CHOLECYSTITIS WITHOUT OBSTRUCTION: Primary | ICD-10-CM

## 2019-04-30 LAB
ALBUMIN SERPL-MCNC: 4.5 G/DL (ref 3.5–5.2)
ALP BLD-CCNC: 110 U/L (ref 40–129)
ALT SERPL-CCNC: 17 U/L (ref 0–40)
ANION GAP SERPL CALCULATED.3IONS-SCNC: 11 MMOL/L (ref 7–16)
AST SERPL-CCNC: 22 U/L (ref 0–39)
BASOPHILS ABSOLUTE: 0.12 E9/L (ref 0–0.2)
BASOPHILS RELATIVE PERCENT: 1.2 % (ref 0–2)
BILIRUB SERPL-MCNC: 0.7 MG/DL (ref 0–1.2)
BUN BLDV-MCNC: 9 MG/DL (ref 6–20)
CALCIUM SERPL-MCNC: 9.4 MG/DL (ref 8.6–10.2)
CHLORIDE BLD-SCNC: 103 MMOL/L (ref 98–107)
CO2: 28 MMOL/L (ref 22–29)
CREAT SERPL-MCNC: 0.7 MG/DL (ref 0.7–1.2)
EOSINOPHILS ABSOLUTE: 0.56 E9/L (ref 0.05–0.5)
EOSINOPHILS RELATIVE PERCENT: 5.7 % (ref 0–6)
GFR AFRICAN AMERICAN: >60
GFR NON-AFRICAN AMERICAN: >60 ML/MIN/1.73
GLUCOSE BLD-MCNC: 109 MG/DL (ref 74–99)
HCT VFR BLD CALC: 44.1 % (ref 37–54)
HEMOGLOBIN: 15.2 G/DL (ref 12.5–16.5)
IMMATURE GRANULOCYTES #: 0.02 E9/L
IMMATURE GRANULOCYTES %: 0.2 % (ref 0–5)
LYMPHOCYTES ABSOLUTE: 2.23 E9/L (ref 1.5–4)
LYMPHOCYTES RELATIVE PERCENT: 22.6 % (ref 20–42)
MCH RBC QN AUTO: 27.7 PG (ref 26–35)
MCHC RBC AUTO-ENTMCNC: 34.5 % (ref 32–34.5)
MCV RBC AUTO: 80.3 FL (ref 80–99.9)
MONOCYTES ABSOLUTE: 0.62 E9/L (ref 0.1–0.95)
MONOCYTES RELATIVE PERCENT: 6.3 % (ref 2–12)
NEUTROPHILS ABSOLUTE: 6.31 E9/L (ref 1.8–7.3)
NEUTROPHILS RELATIVE PERCENT: 64 % (ref 43–80)
PDW BLD-RTO: 13.2 FL (ref 11.5–15)
PLATELET # BLD: 323 E9/L (ref 130–450)
PMV BLD AUTO: 9.3 FL (ref 7–12)
POTASSIUM REFLEX MAGNESIUM: 3.6 MMOL/L (ref 3.5–5)
RBC # BLD: 5.49 E12/L (ref 3.8–5.8)
SODIUM BLD-SCNC: 142 MMOL/L (ref 132–146)
TOTAL PROTEIN: 8 G/DL (ref 6.4–8.3)
WBC # BLD: 9.9 E9/L (ref 4.5–11.5)

## 2019-04-30 PROCEDURE — 3700000001 HC ADD 15 MINUTES (ANESTHESIA): Performed by: SURGERY

## 2019-04-30 PROCEDURE — 3600000012 HC SURGERY LEVEL 2 ADDTL 15MIN: Performed by: SURGERY

## 2019-04-30 PROCEDURE — 80053 COMPREHEN METABOLIC PANEL: CPT

## 2019-04-30 PROCEDURE — 3700000000 HC ANESTHESIA ATTENDED CARE: Performed by: SURGERY

## 2019-04-30 PROCEDURE — 7100000010 HC PHASE II RECOVERY - FIRST 15 MIN: Performed by: SURGERY

## 2019-04-30 PROCEDURE — 47562 LAPAROSCOPIC CHOLECYSTECTOMY: CPT | Performed by: SURGERY

## 2019-04-30 PROCEDURE — 85025 COMPLETE CBC W/AUTO DIFF WBC: CPT

## 2019-04-30 PROCEDURE — 2709999900 HC NON-CHARGEABLE SUPPLY: Performed by: SURGERY

## 2019-04-30 PROCEDURE — 6360000002 HC RX W HCPCS: Performed by: NURSE ANESTHETIST, CERTIFIED REGISTERED

## 2019-04-30 PROCEDURE — 2500000003 HC RX 250 WO HCPCS: Performed by: NURSE ANESTHETIST, CERTIFIED REGISTERED

## 2019-04-30 PROCEDURE — 6360000002 HC RX W HCPCS: Performed by: ANESTHESIOLOGY

## 2019-04-30 PROCEDURE — 7100000011 HC PHASE II RECOVERY - ADDTL 15 MIN: Performed by: SURGERY

## 2019-04-30 PROCEDURE — 88304 TISSUE EXAM BY PATHOLOGIST: CPT

## 2019-04-30 PROCEDURE — 7100000001 HC PACU RECOVERY - ADDTL 15 MIN: Performed by: SURGERY

## 2019-04-30 PROCEDURE — 6360000002 HC RX W HCPCS

## 2019-04-30 PROCEDURE — 36415 COLL VENOUS BLD VENIPUNCTURE: CPT

## 2019-04-30 PROCEDURE — 7100000000 HC PACU RECOVERY - FIRST 15 MIN: Performed by: SURGERY

## 2019-04-30 PROCEDURE — 6360000002 HC RX W HCPCS: Performed by: SURGERY

## 2019-04-30 PROCEDURE — 2500000003 HC RX 250 WO HCPCS: Performed by: SURGERY

## 2019-04-30 PROCEDURE — 2780000010 HC IMPLANT OTHER: Performed by: SURGERY

## 2019-04-30 PROCEDURE — 3600000002 HC SURGERY LEVEL 2 BASE: Performed by: SURGERY

## 2019-04-30 RX ORDER — PROMETHAZINE HYDROCHLORIDE 25 MG/ML
6.25 INJECTION, SOLUTION INTRAMUSCULAR; INTRAVENOUS PRN
Status: COMPLETED | OUTPATIENT
Start: 2019-04-30 | End: 2019-04-30

## 2019-04-30 RX ORDER — ROCURONIUM BROMIDE 10 MG/ML
INJECTION, SOLUTION INTRAVENOUS PRN
Status: DISCONTINUED | OUTPATIENT
Start: 2019-04-30 | End: 2019-04-30 | Stop reason: SDUPTHER

## 2019-04-30 RX ORDER — PROMETHAZINE HYDROCHLORIDE 25 MG/ML
25 INJECTION, SOLUTION INTRAMUSCULAR; INTRAVENOUS PRN
Status: DISCONTINUED | OUTPATIENT
Start: 2019-04-30 | End: 2019-04-30

## 2019-04-30 RX ORDER — LIDOCAINE HYDROCHLORIDE 20 MG/ML
INJECTION, SOLUTION INTRAVENOUS PRN
Status: DISCONTINUED | OUTPATIENT
Start: 2019-04-30 | End: 2019-04-30 | Stop reason: SDUPTHER

## 2019-04-30 RX ORDER — HYDROCODONE BITARTRATE AND ACETAMINOPHEN 5; 325 MG/1; MG/1
1 TABLET ORAL EVERY 6 HOURS PRN
Status: DISCONTINUED | OUTPATIENT
Start: 2019-04-30 | End: 2019-04-30 | Stop reason: HOSPADM

## 2019-04-30 RX ORDER — LABETALOL HYDROCHLORIDE 5 MG/ML
INJECTION, SOLUTION INTRAVENOUS PRN
Status: DISCONTINUED | OUTPATIENT
Start: 2019-04-30 | End: 2019-04-30 | Stop reason: SDUPTHER

## 2019-04-30 RX ORDER — LABETALOL HYDROCHLORIDE 5 MG/ML
5 INJECTION, SOLUTION INTRAVENOUS EVERY 10 MIN PRN
Status: DISCONTINUED | OUTPATIENT
Start: 2019-04-30 | End: 2019-04-30 | Stop reason: HOSPADM

## 2019-04-30 RX ORDER — SODIUM CHLORIDE 0.9 % (FLUSH) 0.9 %
10 SYRINGE (ML) INJECTION PRN
Status: DISCONTINUED | OUTPATIENT
Start: 2019-04-30 | End: 2019-04-30 | Stop reason: HOSPADM

## 2019-04-30 RX ORDER — FENTANYL CITRATE 50 UG/ML
INJECTION, SOLUTION INTRAMUSCULAR; INTRAVENOUS PRN
Status: DISCONTINUED | OUTPATIENT
Start: 2019-04-30 | End: 2019-04-30 | Stop reason: SDUPTHER

## 2019-04-30 RX ORDER — BUPIVACAINE HYDROCHLORIDE AND EPINEPHRINE 5; 5 MG/ML; UG/ML
INJECTION, SOLUTION EPIDURAL; INTRACAUDAL; PERINEURAL PRN
Status: DISCONTINUED | OUTPATIENT
Start: 2019-04-30 | End: 2019-04-30 | Stop reason: ALTCHOICE

## 2019-04-30 RX ORDER — MIDAZOLAM HYDROCHLORIDE 1 MG/ML
INJECTION INTRAMUSCULAR; INTRAVENOUS PRN
Status: DISCONTINUED | OUTPATIENT
Start: 2019-04-30 | End: 2019-04-30 | Stop reason: SDUPTHER

## 2019-04-30 RX ORDER — PROMETHAZINE HYDROCHLORIDE 25 MG/ML
INJECTION, SOLUTION INTRAMUSCULAR; INTRAVENOUS
Status: COMPLETED
Start: 2019-04-30 | End: 2019-04-30

## 2019-04-30 RX ORDER — ONDANSETRON 2 MG/ML
INJECTION INTRAMUSCULAR; INTRAVENOUS PRN
Status: DISCONTINUED | OUTPATIENT
Start: 2019-04-30 | End: 2019-04-30 | Stop reason: SDUPTHER

## 2019-04-30 RX ORDER — ONDANSETRON 2 MG/ML
INJECTION INTRAMUSCULAR; INTRAVENOUS PRN
Status: DISCONTINUED | OUTPATIENT
Start: 2019-04-30 | End: 2019-04-30

## 2019-04-30 RX ORDER — MEPERIDINE HYDROCHLORIDE 50 MG/ML
12.5 INJECTION INTRAMUSCULAR; INTRAVENOUS; SUBCUTANEOUS EVERY 5 MIN PRN
Status: DISCONTINUED | OUTPATIENT
Start: 2019-04-30 | End: 2019-04-30 | Stop reason: HOSPADM

## 2019-04-30 RX ORDER — FENTANYL CITRATE 50 UG/ML
INJECTION, SOLUTION INTRAMUSCULAR; INTRAVENOUS PRN
Status: DISCONTINUED | OUTPATIENT
Start: 2019-04-30 | End: 2019-04-30

## 2019-04-30 RX ORDER — DEXAMETHASONE SODIUM PHOSPHATE 10 MG/ML
INJECTION INTRAMUSCULAR; INTRAVENOUS PRN
Status: DISCONTINUED | OUTPATIENT
Start: 2019-04-30 | End: 2019-04-30 | Stop reason: SDUPTHER

## 2019-04-30 RX ORDER — PROPOFOL 10 MG/ML
INJECTION, EMULSION INTRAVENOUS PRN
Status: DISCONTINUED | OUTPATIENT
Start: 2019-04-30 | End: 2019-04-30 | Stop reason: SDUPTHER

## 2019-04-30 RX ORDER — HYDROCODONE BITARTRATE AND ACETAMINOPHEN 5; 325 MG/1; MG/1
1 TABLET ORAL EVERY 6 HOURS PRN
Qty: 18 TABLET | Refills: 0 | Status: SHIPPED | OUTPATIENT
Start: 2019-04-30 | End: 2019-05-05

## 2019-04-30 RX ORDER — SODIUM CHLORIDE 0.9 % (FLUSH) 0.9 %
10 SYRINGE (ML) INJECTION EVERY 12 HOURS SCHEDULED
Status: DISCONTINUED | OUTPATIENT
Start: 2019-04-30 | End: 2019-04-30 | Stop reason: HOSPADM

## 2019-04-30 RX ADMIN — PROMETHAZINE HYDROCHLORIDE 6.25 MG: 25 INJECTION INTRAMUSCULAR; INTRAVENOUS at 11:44

## 2019-04-30 RX ADMIN — PROMETHAZINE HYDROCHLORIDE 6.25 MG: 25 INJECTION INTRAMUSCULAR; INTRAVENOUS at 12:02

## 2019-04-30 RX ADMIN — ROCURONIUM BROMIDE 50 MG: 10 INJECTION, SOLUTION INTRAVENOUS at 10:18

## 2019-04-30 RX ADMIN — MIDAZOLAM HYDROCHLORIDE 2 MG: 1 INJECTION, SOLUTION INTRAMUSCULAR; INTRAVENOUS at 10:05

## 2019-04-30 RX ADMIN — Medication 2 G: at 10:01

## 2019-04-30 RX ADMIN — LABETALOL HYDROCHLORIDE 10 MG: 5 INJECTION INTRAVENOUS at 10:25

## 2019-04-30 RX ADMIN — FENTANYL CITRATE 100 MCG: 50 INJECTION, SOLUTION INTRAMUSCULAR; INTRAVENOUS at 10:45

## 2019-04-30 RX ADMIN — FENTANYL CITRATE 100 MCG: 50 INJECTION, SOLUTION INTRAMUSCULAR; INTRAVENOUS at 10:15

## 2019-04-30 RX ADMIN — DEXAMETHASONE SODIUM PHOSPHATE 10 MG: 10 INJECTION INTRAMUSCULAR; INTRAVENOUS at 10:20

## 2019-04-30 RX ADMIN — LIDOCAINE HYDROCHLORIDE 100 MG: 20 INJECTION, SOLUTION INTRAVENOUS at 10:15

## 2019-04-30 RX ADMIN — PROPOFOL 200 MG: 10 INJECTION, EMULSION INTRAVENOUS at 10:15

## 2019-04-30 RX ADMIN — LABETALOL HYDROCHLORIDE 10 MG: 5 INJECTION INTRAVENOUS at 10:42

## 2019-04-30 RX ADMIN — ONDANSETRON HYDROCHLORIDE 4 MG: 2 INJECTION, SOLUTION INTRAMUSCULAR; INTRAVENOUS at 10:20

## 2019-04-30 RX ADMIN — SUGAMMADEX 500 MG: 100 INJECTION, SOLUTION INTRAVENOUS at 11:01

## 2019-04-30 ASSESSMENT — PULMONARY FUNCTION TESTS
PIF_VALUE: 31
PIF_VALUE: 29
PIF_VALUE: 40
PIF_VALUE: 28
PIF_VALUE: 34
PIF_VALUE: 39
PIF_VALUE: 19
PIF_VALUE: 37
PIF_VALUE: 39
PIF_VALUE: 0
PIF_VALUE: 38
PIF_VALUE: 0
PIF_VALUE: 28
PIF_VALUE: 19
PIF_VALUE: 29
PIF_VALUE: 30
PIF_VALUE: 30
PIF_VALUE: 1
PIF_VALUE: 24
PIF_VALUE: 32
PIF_VALUE: 38
PIF_VALUE: 1
PIF_VALUE: 32
PIF_VALUE: 32
PIF_VALUE: 36
PIF_VALUE: 2
PIF_VALUE: 32
PIF_VALUE: 4
PIF_VALUE: 32
PIF_VALUE: 29
PIF_VALUE: 0
PIF_VALUE: 39
PIF_VALUE: 31
PIF_VALUE: 2
PIF_VALUE: 32
PIF_VALUE: 32
PIF_VALUE: 39
PIF_VALUE: 39
PIF_VALUE: 40
PIF_VALUE: 39
PIF_VALUE: 0
PIF_VALUE: 32
PIF_VALUE: 1
PIF_VALUE: 0
PIF_VALUE: 34
PIF_VALUE: 34
PIF_VALUE: 31
PIF_VALUE: 29
PIF_VALUE: 32
PIF_VALUE: 40
PIF_VALUE: 3
PIF_VALUE: 28
PIF_VALUE: 33
PIF_VALUE: 33
PIF_VALUE: 36
PIF_VALUE: 41
PIF_VALUE: 0
PIF_VALUE: 1
PIF_VALUE: 35
PIF_VALUE: 1
PIF_VALUE: 37
PIF_VALUE: 35

## 2019-04-30 ASSESSMENT — PAIN SCALES - GENERAL
PAINLEVEL_OUTOF10: 0
PAINLEVEL_OUTOF10: 0
PAINLEVEL_OUTOF10: 3
PAINLEVEL_OUTOF10: 0

## 2019-04-30 ASSESSMENT — PAIN - FUNCTIONAL ASSESSMENT: PAIN_FUNCTIONAL_ASSESSMENT: 0-10

## 2019-04-30 NOTE — ANESTHESIA PRE PROCEDURE
Lane Fernandes in the last 72 hours. Coags: No results found for: PROTIME, INR, APTT    HCG (If Applicable): No results found for: PREGTESTUR, PREGSERUM, HCG, HCGQUANT     ABGs: No results found for: PHART, PO2ART, JQI7JQT, XYS2TAJ, BEART, V8DJVHYS     Type & Screen (If Applicable):  No results found for: [facility]    Anesthesia Evaluation  Patient summary reviewed no history of anesthetic complications:   Airway: Mallampati: III  TM distance: >3 FB   Neck ROM: full  Mouth opening: > = 3 FB Dental:          Pulmonary:Negative Pulmonary ROS breath sounds clear to auscultation                             Cardiovascular:    (+) hypertension:,         Rhythm: regular  Rate: normal                    Neuro/Psych:   Negative Neuro/Psych ROS              GI/Hepatic/Renal:   (+) morbid obesity         ROS comment: GALL STONES. Endo/Other:    (+) hypothyroidism::., .                 Abdominal:   (+) obese,         Vascular: negative vascular ROS. Anesthesia Plan      general     ASA 3       Induction: intravenous. MIPS: Postoperative opioids intended and Prophylactic antiemetics administered. Anesthetic plan and risks discussed with patient. Plan discussed with CRNA.                   Sadie Peace MD   4/30/2019

## 2019-04-30 NOTE — PROGRESS NOTES
CLINICAL PHARMACY NOTE: MEDS TO [address] Select Patient?: Yes  Total # of Prescriptions Filled: 1   The following medications were delivered to the patient:  norco 5-325 mg  Total # of Interventions Completed: 3  Time Spent (min): 30    Additional Documentation:    verifed patient name,  and wb

## 2019-04-30 NOTE — H&P
[address] [facility] [address] Surgery   History and Physical    Patient's Name/Date of Birth: Kamila Messer / 1970 (85 y.o.)    Date: April 30, 2019     PCP: JOHN PAUL Jain CNP    Chief Complaint:   No chief complaint on file. History of Present Illness: RUQ pain acute with radiation to back, associated with PO intake, recently began having sx. Non smoker some ETOH, denies others. No previous surgical hx. Past Medical History:   Diagnosis Date    Hypertension     Obesity     263 #    Thyroid disease        Past Surgical History:   Procedure Laterality Date    WISDOM TOOTH EXTRACTION         Family History   Problem Relation Age of Onset    Cancer Father         Prostate    Stroke Maternal Grandfather     Cancer Paternal Uncle         Prostate    Cancer Paternal Uncle         Prostate       Social History     Socioeconomic History    Marital status:      Spouse name: Not on file    Number of children: Not on file    Years of education: Not on file    Highest education level: Not on file   Occupational History    Not on file   Social Needs    Financial resource strain: Not on file    Food insecurity:     Worry: Not on file     Inability: Not on file    Transportation needs:     Medical: Not on file     Non-medical: Not on file   Tobacco Use    Smoking status: Never Smoker    Smokeless tobacco: Never Used   Substance and Sexual Activity    Alcohol use:  Yes     Alcohol/week: 0.0 oz     Comment: Rarely    Drug use: No    Sexual activity: Yes     Partners: Female   Lifestyle    Physical activity:     Days per week: Not on file     Minutes per session: Not on file    Stress: Not on file   Relationships    Social connections:     Talks on phone: Not on file     Gets together: Not on file     Attends Islam service: Not on file     Active member of club or organization: Not on file     Attends meetings of clubs or organizations: Not on file     Relationship status: Not on file surgery including risk of death, CBD injury and bleeding. He takes a baby aspirin, which was stopped today. - Continue cardiac meds and synthroid on day of surgery     There are no diagnoses linked to this encounter. Doing well no issues today, HP updated, pt ready for surgery.       Electronically Signed by Mehrdad De La Garza MD on 4/30/2019 at 9:20 AM

## 2019-04-30 NOTE — ANESTHESIA POSTPROCEDURE EVALUATION
Department of Anesthesiology  Postprocedure Note    Patient: Kurtis Verduzco  MRN: [id number]  YOB: 1970  Date of evaluation: 4/30/2019  Time:  2:55 PM     Procedure Summary     Date:  04/30/19 Room / Location:  [facility] OR 12 / [facility] OR    Anesthesia Start:  1001 Anesthesia Stop:  1127    Procedure:  LAPAROSCOPIC CHOLECYSTECTOMY POSSIBLE GRAM, POSSIBLE OPEN (N/A ) Diagnosis:  (GALL STONES)    Surgeon:  Loretha Riedel, MD Responsible Provider:  Yoni Owens MD    Anesthesia Type:  general ASA Status:  3          Anesthesia Type: general    Edith Phase I: Edith Score: 10    Edith Phase II: Edith Score: 10    Last vitals: Reviewed and per EMR flowsheets.        Anesthesia Post Evaluation    Patient location during evaluation: PACU  Patient participation: complete - patient participated  Level of consciousness: awake  Airway patency: patent  Nausea & Vomiting: no nausea and no vomiting  Complications: no  Cardiovascular status: hemodynamically stable  Respiratory status: acceptable  Hydration status: stable

## 2019-04-30 NOTE — OP NOTE
OP Note      Date: 4/30/2019  Time: 11:15 AM      Kamila Messer is a 53 y.o. male. Pre-op Diagnosis:   GALL STONES    Post-op Diagnosis List:  GALL STONES       Procedure(s):  LAPAROSCOPIC CHOLECYSTECTOMY POSSIBLE GRAM, POSSIBLE OPEN    Surgeon:  Surgeon(s):  Yelitza Long MD    Staff:   Circulator: Seferino Ramos RN; Angelia Sorensen RN  Scrub Person First: Charmayne Ar  Resident: Parvez Max MD    Anesthesia:  General    Findings: chronically inflamed gallbladder     EBL: 50    Drains: None    Specimens:  ID Type Source Tests Collected by Time Destination   A : GALLBLADDER AND CONTENTS Tissue Gallbladder SURGICAL PATHOLOGY Yelitza Long MD 4/30/2019 7401          Complications:  None    Disposition of Patient:  Tolerated procedure well    [facility] Wound Closure Status:  Primary    Surgical Course:  Patient is a 53 y.o. male who was diagnosed with biliary cholic. Risks benefits and alternatives of the procedure, including bleeding, infection, bile leak, CBD injury were discussed with the patient. The planned procedure was agreed to and informed consent was obtained. After informed consent was obtained patient brought to operating table placed in supine position. General anesthesia was induced. Patient is prepped draped in usual sterile fashion. Time-out was performed identifying correct patient procedure. SCDs were on and functional.  Patient received appropriate perioperative antibiotics. Left upper quadrant nick was made and Veress needle was inserted. Pneumoperitoneum was then induced which the patient tolerated. 5mm midline periumbilical incision was made and a 5mm trocar was used to facilitate abdominal entry. We ensured we visualized abdominal wall layer by layer until entry of the abdominal cavity was noted. No injury to the abdominal contents were identified. The left upper quadrant Veress incision entry site was inspected there is noted be no injury at that location either.

## 2019-05-03 ENCOUNTER — TELEPHONE (OUTPATIENT)
Dept: SURGERY | Age: 49
End: 2019-05-03

## 2019-05-03 NOTE — TELEPHONE ENCOUNTER
Attempted to call the patient to schedule the follow up appt . No answer, left message on the phone. Per [facility], post op appt is included with the lap viral.    Electronically signed by Rukhsana Chandra on 5/3/2019 at 9:05 AM

## 2019-05-06 ENCOUNTER — TELEPHONE (OUTPATIENT)
Dept: SURGERY | Age: 49
End: 2019-05-06

## 2019-05-06 NOTE — TELEPHONE ENCOUNTER
Received a call from the patient who stated that he is having vertigo since saturday night. The patient would like to know if its caused by lap viral. The patient wants to call [facility] NP to schedule the appt. The patient would like to know if that's the best course of action. Spoke with Dr. Yoselin Peters on the phone. Per Dr. Yoselin Peters, the patient can make an appt with his NP. It's not caused from lap viral. Attempted to call the patient, no answer, left above message on the phone.   Electronically signed by Lena Natarajan on 5/6/2019 at 3:25 PM

## 2019-05-07 ENCOUNTER — OFFICE VISIT (OUTPATIENT)
Dept: FAMILY MEDICINE CLINIC | Age: 49
End: 2019-05-07

## 2019-05-07 VITALS
WEIGHT: 253 LBS | BODY MASS INDEX: 39.63 KG/M2 | DIASTOLIC BLOOD PRESSURE: 90 MMHG | OXYGEN SATURATION: 95 % | HEART RATE: 94 BPM | RESPIRATION RATE: 18 BRPM | TEMPERATURE: 99 F | SYSTOLIC BLOOD PRESSURE: 142 MMHG

## 2019-05-07 DIAGNOSIS — R42 DIZZINESS: Primary | ICD-10-CM

## 2019-05-07 PROCEDURE — 99213 OFFICE O/P EST LOW 20 MIN: CPT | Performed by: NURSE PRACTITIONER

## 2019-05-07 RX ORDER — MECLIZINE HCL 12.5 MG/1
TABLET ORAL
Qty: 5 TABLET | Refills: 0 | Status: SHIPPED | OUTPATIENT
Start: 2019-05-07 | End: 2022-03-15

## 2019-05-07 NOTE — PROGRESS NOTES
Buddy Bob is a 53 y.o. male who presents today for   Chief Complaint   Patient presents with    Dizziness         HPI    Pt presents today with c/o intermittent dizziness since her had a Lap Clemencia on 4/30/19. Pt stated he had 2-3 episodes of intermittent dizziness. Pt stated he did change his diet and is eating lean meats, fruits and vegetables. Pt has decreased his carb intake, he also lost 10 lbs since his surgery 1 week ago, pt stated he is staying well hydrated and drinking 6-8 glasses of water daily. Pt does have a h/o HTN that is under good control with Lisinopril. Pt does have a h/o Hypothyroid and recent TSH was WNL. Pt stated dizziness can occur at any time, sitting or with activity. Pt denies any other Neuro-related issues      [address]:  Patient's past medical, surgical, social and/or family history reviewed, updated in chart, and are non-contributory (unless otherwise stated). Medications and allergies also reviewed and updated in chart. Review of Systems  Review of Systems   Constitutional: Negative for activity change, appetite change, chills, diaphoresis, fatigue and fever. 10 lb weight loss in 1 week following surgery   HENT: Negative for congestion, ear discharge, ear pain, facial swelling, hearing loss, mouth sores, nosebleeds, postnasal drip, rhinorrhea, sinus pressure, sinus pain, sneezing, sore throat, tinnitus, trouble swallowing and voice change. Eyes: Negative for photophobia, pain, discharge, redness, itching and visual disturbance. Respiratory: Negative for apnea, cough, choking, chest tightness, shortness of breath, wheezing and stridor. Gastrointestinal: Negative for abdominal distention, abdominal pain, constipation, diarrhea, nausea and vomiting. Neurological: Positive for dizziness (intermittent). Negative for tremors, seizures, syncope, facial asymmetry, speech difficulty, weakness, light-headedness, numbness and headaches.        Physical Exam:    VS:  BP (!) 142/90 (Site: Right Upper Arm, Position: Sitting, Cuff Size: Large Adult)   Pulse 94   Temp 99 °F (37.2 °C) (Oral)   Resp 18   Wt 253 lb (114.8 kg)   SpO2 95%   BMI 39.63 kg/m²   LAST WEIGHT:  Wt Readings from Last 3 Encounters:   05/07/19 253 lb (114.8 kg)   04/30/19 263 lb (119.3 kg)   04/25/19 263 lb (119.3 kg)     Physical Exam   Constitutional: He is oriented to person, place, and time. He appears well-developed and well-nourished. No distress. HENT:   Head: Normocephalic and atraumatic. Right Ear: External ear normal.   Left Ear: External ear normal.   Nose: Nose normal.   Mouth/Throat: Oropharynx is clear and moist. No oropharyngeal exudate. Eyes: Pupils are equal, round, and reactive to light. Conjunctivae are normal. Right eye exhibits no discharge. Left eye exhibits no discharge. Neck: Normal range of motion. Neck supple. No JVD present. No thyromegaly present. Cardiovascular: Normal rate, regular rhythm, normal heart sounds and intact distal pulses. Exam reveals no gallop and no friction rub. No murmur heard. No peripheral edema   Pulmonary/Chest: Effort normal and breath sounds normal. No stridor. No respiratory distress. He has no wheezes. He has no rales. He exhibits no tenderness. Abdominal: Soft. Bowel sounds are normal.   S/p Lap Clemencia   Lymphadenopathy:     He has no cervical adenopathy. Neurological: He is alert and oriented to person, place, and time. He displays normal reflexes. No cranial nerve deficit or sensory deficit. He exhibits normal muscle tone. Coordination normal.   Skin: Skin is warm and dry. No rash noted. He is not diaphoretic. No erythema. No pallor. Nursing note and vitals reviewed. Assessment / Plan:      Wild Norman was seen today for dizziness.     Diagnoses and all orders for this visit:    Dizziness  Advised on sedating effects of Antivert  Advised to increase water intake  Advised to notify office with any worsening or

## 2019-05-12 ASSESSMENT — ENCOUNTER SYMPTOMS
TROUBLE SWALLOWING: 0
ABDOMINAL PAIN: 0
COUGH: 0
EYE DISCHARGE: 0
VOMITING: 0
CHEST TIGHTNESS: 0
SINUS PAIN: 0
VOICE CHANGE: 0
APNEA: 0
EYE PAIN: 0
EYE REDNESS: 0
RHINORRHEA: 0
CHOKING: 0
NAUSEA: 0
EYE ITCHING: 0
SHORTNESS OF BREATH: 0
FACIAL SWELLING: 0
SORE THROAT: 0
DIARRHEA: 0
CONSTIPATION: 0
SINUS PRESSURE: 0
PHOTOPHOBIA: 0
STRIDOR: 0
WHEEZING: 0
ABDOMINAL DISTENTION: 0

## 2019-05-28 RX ORDER — LISINOPRIL 20 MG/1
20 TABLET ORAL DAILY
Qty: 90 TABLET | Refills: 1 | Status: SHIPPED | OUTPATIENT
Start: 2019-05-28 | End: 2019-07-23 | Stop reason: DRUGHIGH

## 2019-06-03 RX ORDER — LEVOTHYROXINE SODIUM 0.03 MG/1
25 TABLET ORAL DAILY
Qty: 90 TABLET | Refills: 1 | Status: SHIPPED | OUTPATIENT
Start: 2019-06-03 | End: 2019-11-29 | Stop reason: SDUPTHER

## 2019-07-23 RX ORDER — LISINOPRIL 40 MG/1
40 TABLET ORAL DAILY
Qty: 90 TABLET | Refills: 0 | COMMUNITY
Start: 2019-07-23 | End: 2019-10-29 | Stop reason: SDUPTHER

## 2019-07-31 ENCOUNTER — TELEPHONE (OUTPATIENT)
Dept: FAMILY MEDICINE CLINIC | Age: 49
End: 2019-07-31

## 2019-12-01 RX ORDER — LEVOTHYROXINE SODIUM 0.03 MG/1
25 TABLET ORAL DAILY
Qty: 90 TABLET | Refills: 1 | Status: SHIPPED
Start: 2019-12-01 | End: 2020-05-29 | Stop reason: SDUPTHER

## 2020-04-29 RX ORDER — LISINOPRIL 40 MG/1
40 TABLET ORAL DAILY
Qty: 90 TABLET | Refills: 1 | Status: SHIPPED
Start: 2020-04-29 | End: 2020-10-30 | Stop reason: SDUPTHER

## 2020-05-29 RX ORDER — LEVOTHYROXINE SODIUM 0.03 MG/1
25 TABLET ORAL DAILY
Qty: 90 TABLET | Refills: 1 | Status: SHIPPED
Start: 2020-05-29 | End: 2020-11-27 | Stop reason: SDUPTHER

## 2020-06-04 RX ORDER — AMLODIPINE BESYLATE 5 MG/1
5 TABLET ORAL DAILY
Qty: 90 TABLET | Refills: 1 | Status: SHIPPED
Start: 2020-06-04 | End: 2020-11-23 | Stop reason: SDUPTHER

## 2020-11-03 ENCOUNTER — OFFICE VISIT (OUTPATIENT)
Dept: FAMILY MEDICINE CLINIC | Age: 50
End: 2020-11-03

## 2020-11-03 VITALS
HEART RATE: 88 BPM | DIASTOLIC BLOOD PRESSURE: 88 MMHG | RESPIRATION RATE: 18 BRPM | BODY MASS INDEX: 40.24 KG/M2 | TEMPERATURE: 96.8 F | WEIGHT: 256.4 LBS | SYSTOLIC BLOOD PRESSURE: 136 MMHG | HEIGHT: 67 IN | OXYGEN SATURATION: 100 %

## 2020-11-03 DIAGNOSIS — E03.9 HYPOTHYROIDISM, UNSPECIFIED TYPE: ICD-10-CM

## 2020-11-03 LAB — TSH SERPL DL<=0.05 MIU/L-ACNC: 3.46 UIU/ML (ref 0.27–4.2)

## 2020-11-03 PROCEDURE — 99213 OFFICE O/P EST LOW 20 MIN: CPT | Performed by: NURSE PRACTITIONER

## 2020-11-03 ASSESSMENT — PATIENT HEALTH QUESTIONNAIRE - PHQ9
1. LITTLE INTEREST OR PLEASURE IN DOING THINGS: 0
SUM OF ALL RESPONSES TO PHQ QUESTIONS 1-9: 0
2. FEELING DOWN, DEPRESSED OR HOPELESS: 0
SUM OF ALL RESPONSES TO PHQ QUESTIONS 1-9: 0
SUM OF ALL RESPONSES TO PHQ QUESTIONS 1-9: 0
SUM OF ALL RESPONSES TO PHQ9 QUESTIONS 1 & 2: 0

## 2020-11-03 ASSESSMENT — ENCOUNTER SYMPTOMS
DIARRHEA: 0
BACK PAIN: 0
CHOKING: 0
COUGH: 0
NAUSEA: 0
BLOOD IN STOOL: 0
PHOTOPHOBIA: 0
WHEEZING: 0
EYE ITCHING: 0
EYE PAIN: 0
CONSTIPATION: 0
ABDOMINAL DISTENTION: 0
EYE REDNESS: 0
EYE DISCHARGE: 0
VOMITING: 0
CHEST TIGHTNESS: 0
ABDOMINAL PAIN: 0
SHORTNESS OF BREATH: 0
STRIDOR: 0
COLOR CHANGE: 0
APNEA: 0

## 2020-11-03 NOTE — PROGRESS NOTES
Althea José is a 53 y.o. male who presents today for   Chief Complaint   Patient presents with    Hypertension    Hypothyroidism         HPI    Hypertension  Patient is here for follow-up of elevated blood pressure. He is not exercising and is adherent to a low-salt diet. Blood pressure is well controlled at home. Cardiac symptoms: none. Patient denies chest pain, chest pressure/discomfort, claudication, dyspnea, irregular heart beat, lower extremity edema, near-syncope, orthopnea and palpitations. Cardiovascular risk factors: hypertension, male gender, obesity (BMI >= 30 kg/m2) and sedentary lifestyle. Use of agents associated with hypertension: thyroid hormones. Hypothyroidism: Recent symptoms: none. He denies weight gain, weight loss, cold intolerance, heat intolerance, hair loss, palpitations and dysphagia. Patient is  taking his medication consistently on an empty stomach. Pt is due for repeat TSH today      Lab Results   Component Value Date    TSH 4.270 (H) 04/08/2019    TSH 2.260 09/10/2018    TSH 4.370 (H) 08/06/2018       Pt does not currently have Healthcare Coverage      [address] [place] [address]:  Patient's past medical, surgical, social and/or family history reviewed, updated in chart, and are non-contributory (unless otherwise stated). Medications and allergies also reviewed and updated in chart. Review of Systems  Review of Systems   Constitutional: Negative for activity change, appetite change, chills, diaphoresis, fatigue, fever and unexpected weight change. Eyes: Negative for photophobia, pain, discharge, redness, itching and visual disturbance. Respiratory: Negative for apnea, cough, choking, chest tightness, shortness of breath, wheezing and stridor. Cardiovascular: Negative for chest pain, palpitations and leg swelling. HTN   Gastrointestinal: Negative for abdominal distention, abdominal pain, blood in stool, constipation, diarrhea, nausea and vomiting.    Endocrine: Negative for cold intolerance, heat intolerance, polydipsia, polyphagia and polyuria. Hypothyroid   Genitourinary: Negative for decreased urine volume, difficulty urinating, enuresis, flank pain, frequency, hematuria and urgency. Musculoskeletal: Negative for arthralgias, back pain, joint swelling and myalgias. Skin: Negative for color change, pallor, rash and wound. Neurological: Negative for dizziness, tremors, seizures, syncope, facial asymmetry, speech difficulty, weakness, light-headedness, numbness and headaches. Physical Exam:    VS:  /88 (Site: Right Upper Arm, Position: Sitting, Cuff Size: Large Adult)   Pulse 88   Temp 96.8 °F (36 °C) (Temporal)   Resp 18   Ht 5' 7\" (1.702 m)   Wt 256 lb 6.4 oz (116.3 kg)   SpO2 100%   BMI 40.16 kg/m²   LAST WEIGHT:  Wt Readings from Last 3 Encounters:   11/03/20 256 lb 6.4 oz (116.3 kg)   05/07/19 253 lb (114.8 kg)   04/30/19 263 lb (119.3 kg)     Physical Exam  Vitals signs and nursing note reviewed. Constitutional:       General: He is not in acute distress. Appearance: Normal appearance. He is well-developed. He is obese. He is not ill-appearing, toxic-appearing or diaphoretic. Eyes:      General:         Right eye: No discharge. Left eye: No discharge. Conjunctiva/sclera: Conjunctivae normal.      Pupils: Pupils are equal, round, and reactive to light. Neck:      Musculoskeletal: Normal range of motion and neck supple. No neck rigidity or muscular tenderness. Thyroid: No thyromegaly. Vascular: No JVD. Cardiovascular:      Rate and Rhythm: Normal rate and regular rhythm. Pulses: Normal pulses. Heart sounds: Normal heart sounds. No murmur. No friction rub. Comments: No peripheral edema  Pulmonary:      Effort: Pulmonary effort is normal. No respiratory distress. Breath sounds: Normal breath sounds. No stridor. No wheezing, rhonchi or rales. Chest:      Chest wall: No tenderness.    Abdominal: General: There is no distension. Palpations: Abdomen is soft. Tenderness: There is no abdominal tenderness. Musculoskeletal: Normal range of motion. General: No swelling. Right lower leg: No edema. Left lower leg: No edema. Lymphadenopathy:      Cervical: No cervical adenopathy. Skin:     General: Skin is warm and dry. Coloration: Skin is not jaundiced or pale. Findings: No bruising, erythema, lesion or rash. Neurological:      Mental Status: He is alert and oriented to person, place, and time. Motor: No abnormal muscle tone. Gait: Gait normal.   Psychiatric:         Mood and Affect: Mood normal.         Behavior: Behavior normal.         Thought Content: Thought content normal.         Judgment: Judgment normal.           Assessment / Plan:      Yanelis Catherine was seen today for hypertension and hypothyroidism. Diagnoses and all orders for this visit:    Essential hypertension-stable/controlled  Continue current treatment plan    Hypothyroidism, unspecified type  Continue Levothyroxine at current dosage until TSH is reviewed  -     TSH without Reflex; Future         Call or go to ED immediately if symptoms worsen or persist.    Return for f/u will be based on labs. , or sooner if necessary. Educational materials and/or home exercises printed for patient's review and were included in patient instructions on his/her After Visit Summary and given to patient at the end of visit. Counseled regarding above diagnosis, including possible risks and complications,  especially if left uncontrolled. Counseled regarding the possible side effects, risks, benefits and alternatives to treatment; patient and/or guardian verbalizes understanding, agrees, feels comfortable with and wishes to proceed with above treatment plan.     Advised patient to call with any new medication issues, and read all Rx info from pharmacy to assure aware of all possible risks and side

## 2020-11-23 RX ORDER — AMLODIPINE BESYLATE 5 MG/1
5 TABLET ORAL DAILY
Qty: 90 TABLET | Refills: 1 | Status: SHIPPED
Start: 2020-11-23 | End: 2021-06-07 | Stop reason: SDUPTHER

## 2020-11-29 RX ORDER — LEVOTHYROXINE SODIUM 0.03 MG/1
25 TABLET ORAL DAILY
Qty: 90 TABLET | Refills: 1 | Status: SHIPPED
Start: 2020-11-29 | End: 2021-06-02 | Stop reason: SDUPTHER

## 2020-12-15 RX ORDER — BUTALBITAL, ACETAMINOPHEN AND CAFFEINE 50; 325; 40 MG/1; MG/1; MG/1
1 TABLET ORAL EVERY 6 HOURS PRN
Qty: 15 TABLET | Refills: 0 | Status: SHIPPED
Start: 2020-12-15 | End: 2021-03-23 | Stop reason: ALTCHOICE

## 2021-03-23 DIAGNOSIS — G44.201 ACUTE INTRACTABLE TENSION-TYPE HEADACHE: Primary | ICD-10-CM

## 2021-03-23 RX ORDER — BUTALBITAL, ACETAMINOPHEN, CAFFEINE AND CODEINE PHOSPHATE 50; 325; 40; 30 MG/1; MG/1; MG/1; MG/1
1 CAPSULE ORAL EVERY 6 HOURS PRN
Qty: 20 CAPSULE | Refills: 0 | Status: SHIPPED | OUTPATIENT
Start: 2021-03-23 | End: 2021-04-02

## 2021-06-02 RX ORDER — LEVOTHYROXINE SODIUM 0.03 MG/1
25 TABLET ORAL DAILY
Qty: 90 TABLET | Refills: 1 | Status: SHIPPED
Start: 2021-06-02 | End: 2021-12-09 | Stop reason: SDUPTHER

## 2021-06-07 DIAGNOSIS — I10 ESSENTIAL HYPERTENSION: ICD-10-CM

## 2021-06-07 RX ORDER — AMLODIPINE BESYLATE 5 MG/1
5 TABLET ORAL DAILY
Qty: 90 TABLET | Refills: 1 | Status: SHIPPED
Start: 2021-06-07 | End: 2021-12-09 | Stop reason: SDUPTHER

## 2021-08-19 RX ORDER — LISINOPRIL 40 MG/1
40 TABLET ORAL DAILY
Qty: 90 TABLET | Refills: 1 | Status: SHIPPED
Start: 2021-08-19 | End: 2022-02-09 | Stop reason: SDUPTHER

## 2021-12-09 ENCOUNTER — TELEPHONE (OUTPATIENT)
Dept: FAMILY MEDICINE CLINIC | Age: 51
End: 2021-12-09

## 2021-12-09 DIAGNOSIS — I10 ESSENTIAL HYPERTENSION: ICD-10-CM

## 2021-12-09 RX ORDER — AMLODIPINE BESYLATE 5 MG/1
5 TABLET ORAL DAILY
Qty: 90 TABLET | Refills: 1 | Status: SHIPPED
Start: 2021-12-09 | End: 2022-03-15 | Stop reason: SDUPTHER

## 2021-12-09 RX ORDER — LEVOTHYROXINE SODIUM 0.03 MG/1
25 TABLET ORAL DAILY
Qty: 90 TABLET | Refills: 1 | Status: SHIPPED
Start: 2021-12-09 | End: 2022-03-11 | Stop reason: SDUPTHER

## 2022-03-11 RX ORDER — LEVOTHYROXINE SODIUM 0.03 MG/1
25 TABLET ORAL DAILY
Qty: 90 TABLET | Refills: 0 | Status: SHIPPED
Start: 2022-03-11 | End: 2022-06-08 | Stop reason: SDUPTHER

## 2022-03-15 ENCOUNTER — TELEPHONE (OUTPATIENT)
Dept: FAMILY MEDICINE CLINIC | Age: 52
End: 2022-03-15

## 2022-03-15 ENCOUNTER — OFFICE VISIT (OUTPATIENT)
Dept: FAMILY MEDICINE CLINIC | Age: 52
End: 2022-03-15

## 2022-03-15 VITALS
WEIGHT: 268.8 LBS | HEIGHT: 67 IN | TEMPERATURE: 98.7 F | BODY MASS INDEX: 42.19 KG/M2 | HEART RATE: 85 BPM | DIASTOLIC BLOOD PRESSURE: 99 MMHG | RESPIRATION RATE: 18 BRPM | OXYGEN SATURATION: 98 % | SYSTOLIC BLOOD PRESSURE: 155 MMHG

## 2022-03-15 DIAGNOSIS — E03.9 HYPOTHYROIDISM, UNSPECIFIED TYPE: ICD-10-CM

## 2022-03-15 DIAGNOSIS — I10 ESSENTIAL HYPERTENSION: Primary | ICD-10-CM

## 2022-03-15 DIAGNOSIS — Z12.5 SCREENING FOR PROSTATE CANCER: ICD-10-CM

## 2022-03-15 PROCEDURE — 99214 OFFICE O/P EST MOD 30 MIN: CPT | Performed by: STUDENT IN AN ORGANIZED HEALTH CARE EDUCATION/TRAINING PROGRAM

## 2022-03-15 RX ORDER — AMLODIPINE BESYLATE 10 MG/1
10 TABLET ORAL DAILY
Qty: 90 TABLET | Refills: 3 | Status: SHIPPED
Start: 2022-03-15 | End: 2022-06-08 | Stop reason: SDUPTHER

## 2022-03-15 SDOH — ECONOMIC STABILITY: FOOD INSECURITY: WITHIN THE PAST 12 MONTHS, THE FOOD YOU BOUGHT JUST DIDN'T LAST AND YOU DIDN'T HAVE MONEY TO GET MORE.: NEVER TRUE

## 2022-03-15 SDOH — ECONOMIC STABILITY: FOOD INSECURITY: WITHIN THE PAST 12 MONTHS, YOU WORRIED THAT YOUR FOOD WOULD RUN OUT BEFORE YOU GOT MONEY TO BUY MORE.: NEVER TRUE

## 2022-03-15 ASSESSMENT — ENCOUNTER SYMPTOMS
NAUSEA: 0
COUGH: 0
ABDOMINAL PAIN: 0
WHEEZING: 0
DIARRHEA: 0
CONSTIPATION: 0
BLOOD IN STOOL: 0
SHORTNESS OF BREATH: 0

## 2022-03-15 ASSESSMENT — PATIENT HEALTH QUESTIONNAIRE - PHQ9
SUM OF ALL RESPONSES TO PHQ QUESTIONS 1-9: 0
SUM OF ALL RESPONSES TO PHQ QUESTIONS 1-9: 0
1. LITTLE INTEREST OR PLEASURE IN DOING THINGS: 0
SUM OF ALL RESPONSES TO PHQ QUESTIONS 1-9: 0
SUM OF ALL RESPONSES TO PHQ9 QUESTIONS 1 & 2: 0
SUM OF ALL RESPONSES TO PHQ QUESTIONS 1-9: 0
2. FEELING DOWN, DEPRESSED OR HOPELESS: 0

## 2022-03-15 ASSESSMENT — SOCIAL DETERMINANTS OF HEALTH (SDOH): HOW HARD IS IT FOR YOU TO PAY FOR THE VERY BASICS LIKE FOOD, HOUSING, MEDICAL CARE, AND HEATING?: NOT HARD AT ALL

## 2022-03-15 NOTE — PROGRESS NOTES
Glenna Clancy (:  1970) is a 49 y.o. male, New patient , established at office. here for evaluation of the following:  Establish Care         ASSESSMENT/PLAN    Patient is overall doing well, discussed heart healthy lifestyle and nutrition changes as well as increase activity can help prevent chronic illness and help reduce his blood pressure, he endorsed understanding, knows her changes he can make, his wife is also trying to be healthy and they can work on this together    1. Essential hypertension  Chronic, not well controlled, will increase dose of amlodipine, recommended lifestyle changes  -     CBC with Auto Differential; Future  -     Comprehensive Metabolic Panel; Future  2. Hypothyroidism, unspecified type  Chronic, unclear control, will get TSH, dose adjustments as needed  -     TSH; Future  3. Screening for prostate cancer  PSA was less than 1, discussed benefits and risks of PSA testing, discussed following the trend, patient endorsed understanding, does have father and uncle with prostate cancer and does have some concern  -     PSA Screening; Future    No follow-ups on file. Subjective   SUBJECTIVE/OBJECTIVE:  HPI    Has high BP today. He steven not have a cuff at home. He used to get head aches, wasn't sure if it was associated with BP. Dad had prostate cancer and would like PSA testing     Declined preventative screening identified as care gaps unless ordered through this visit    PHQ2/PHQ9  PHQ-2 Score: 0  PHQ-2 Over the past 2 weeks, how often have you been bothered by any of the following problems? Little interest or pleasure in doing things: Not at all  Feeling down, depressed, or hopeless: Not at all  PHQ-2 Score: 0   PHQ-9 Total Score: 0 (3/15/2022 12:24 PM)       Past Medical History:  has a past medical history of Hypertension, Obesity, and Thyroid disease.    Past Surgical History:  has a past surgical history that includes [place] tooth extraction and Cholecystectomy, laparoscopic (N/A, 4/30/2019). Social History:  reports that he has never smoked. He has never used smokeless tobacco. He reports current alcohol use. He reports that he does not use drugs. Family History: family history includes Cancer in his father, paternal uncle, and paternal uncle; Stroke in his maternal grandfather. Allergies: Patient has no known allergies. Medications:   Current Outpatient Medications   Medication Sig Dispense Refill    amLODIPine (NORVASC) 10 MG tablet Take 1 tablet by mouth daily 90 tablet 3    levothyroxine (SYNTHROID) 25 MCG tablet Take 1 tablet by mouth Daily 90 tablet 0    lisinopril (PRINIVIL;ZESTRIL) 40 MG tablet Take 1 tablet by mouth daily 90 tablet 0    aspirin 81 MG tablet Take 81 mg by mouth daily      Multiple Vitamins-Minerals (MULTIVITAMIN ADULT PO) Take 1 tablet by mouth daily      calcium carbonate 600 MG TABS tablet Take 2 tablets by mouth daily      Omega-3 Fatty Acids (OMEGA-3 CF PO) Take by mouth daily       No current facility-administered medications for this visit. Allergies: Patient has no known allergies. Review of Systems   Constitutional: Negative for chills, fatigue, fever and unexpected weight change. HENT: Negative for hearing loss. Eyes: Negative for visual disturbance. Respiratory: Negative for cough, shortness of breath and wheezing. Cardiovascular: Negative for chest pain, palpitations and leg swelling. Gastrointestinal: Negative for abdominal pain, blood in stool, constipation, diarrhea and nausea. Genitourinary: Negative for dysuria. Musculoskeletal: Negative for arthralgias. Neurological: Negative for weakness, light-headedness, numbness and headaches. Psychiatric/Behavioral: Negative for dysphoric mood and sleep disturbance. The patient is not nervous/anxious. All other systems reviewed and are negative.          Objective   BP (!) 155/99 (Site: Left Upper Arm, Position: Sitting, Cuff Size: Large Adult) Pulse 85   Temp 98.7 °F (37.1 °C) (Temporal)   Resp 18   Ht 5' 7\" (1.702 m)   Wt 268 lb 12.8 oz (121.9 kg)   SpO2 98%   BMI 42.10 kg/m²       No results found for: LABA1C  Lab Results   Component Value Date    CHOL 177 04/08/2019    CHOL 178 06/13/2018    CHOL 174 08/01/2016     Lab Results   Component Value Date    TRIG 104 04/08/2019    TRIG 82 06/13/2018    TRIG 102 08/01/2016     Lab Results   Component Value Date    HDL 46 04/08/2019    HDL 40 06/13/2018    HDL 41 08/01/2016     Lab Results   Component Value Date    LDLCALC 110 (H) 04/08/2019    LDLCALC 122 (H) 06/13/2018    LDLCALC 113 (H) 08/01/2016     Lab Results   Component Value Date    LABVLDL 21 04/08/2019    LABVLDL 16 06/13/2018    LABVLDL 20 08/01/2016     No results found for: CHOLHDLRATIO   CREATININE   Date Value Ref Range Status   04/30/2019 0.7 0.7 - 1.2 mg/dL Final   04/08/2019 0.8 0.7 - 1.2 mg/dL Final   06/13/2018 0.8 0.7 - 1.2 mg/dL Final       The 10-year ASCVD risk score (Jessie Rodriguez et al., 2013) is: 5.2%    Values used to calculate the score:      Age: 49 years      Sex: Male      Is Non- : No      Diabetic: No      Tobacco smoker: No      Systolic Blood Pressure: 493 mmHg      Is BP treated: Yes      HDL Cholesterol: 46 mg/dL      Total Cholesterol: 177 mg/dL     Physical Exam  Constitutional:       General: He is not in acute distress. Appearance: Normal appearance. HENT:      Head: Normocephalic and atraumatic. Right Ear: External ear normal.      Nose: Nose normal.      Mouth/Throat:      Mouth: Mucous membranes are moist.   Eyes:      Extraocular Movements: Extraocular movements intact. Conjunctiva/sclera: Conjunctivae normal.   Cardiovascular:      Rate and Rhythm: Normal rate and regular rhythm. Heart sounds: No murmur heard. Pulmonary:      Effort: Pulmonary effort is normal.      Breath sounds: Normal breath sounds. No wheezing.    Musculoskeletal:         General: Normal range of motion. Cervical back: Normal range of motion and neck supple. Lymphadenopathy:      Cervical: No cervical adenopathy. Neurological:      General: No focal deficit present. Mental Status: He is alert. Psychiatric:         Mood and Affect: Mood normal.         Behavior: Behavior normal.             An electronic signature was used to authenticate this note. --Georgette Liang MD       *NOTE: This report was transcribed using voice recognition software. Every effort was made to ensure accuracy; however, inadvertent computerized transcription errors may be present.

## 2022-03-15 NOTE — TELEPHONE ENCOUNTER
----- Message from Rhett Elmore MD sent at 3/15/2022  2:31 PM EDT -----  Regarding: Blood pressure was high and a order to increase dose of his amlodipine to the pharmacy  Please call patient and let him know that since his blood pressure remained elevated throughout his whole visit I did order an increased dose of his amlodipine to the pharmacy, please schedule him a visit to come in to get a nurse visit for blood pressure check about 2 weeks after starting the medicine

## 2022-03-16 ENCOUNTER — HOSPITAL ENCOUNTER (OUTPATIENT)
Age: 52
Discharge: HOME | End: 2022-03-16

## 2022-03-16 DIAGNOSIS — E03.9 HYPOTHYROIDISM, UNSPECIFIED TYPE: ICD-10-CM

## 2022-03-16 DIAGNOSIS — I10 ESSENTIAL HYPERTENSION: ICD-10-CM

## 2022-03-16 DIAGNOSIS — Z12.5 SCREENING FOR PROSTATE CANCER: ICD-10-CM

## 2022-03-16 LAB
ALBUMIN SERPL-MCNC: 4.3 G/DL (ref 3.5–5.2)
ALP BLD-CCNC: 97 U/L (ref 40–129)
ALT SERPL-CCNC: 20 U/L (ref 0–40)
ANION GAP SERPL CALCULATED.3IONS-SCNC: 11 MMOL/L (ref 7–16)
AST SERPL-CCNC: 19 U/L (ref 0–39)
BASOPHILS ABSOLUTE: 0.1 E9/L (ref 0–0.2)
BASOPHILS RELATIVE PERCENT: 1.1 % (ref 0–2)
BILIRUB SERPL-MCNC: 0.5 MG/DL (ref 0–1.2)
BUN BLDV-MCNC: 13 MG/DL (ref 6–20)
CALCIUM SERPL-MCNC: 9.3 MG/DL (ref 8.6–10.2)
CHLORIDE BLD-SCNC: 102 MMOL/L (ref 98–107)
CO2: 28 MMOL/L (ref 22–29)
CREAT SERPL-MCNC: 0.8 MG/DL (ref 0.7–1.2)
EOSINOPHILS ABSOLUTE: 0.46 E9/L (ref 0.05–0.5)
EOSINOPHILS RELATIVE PERCENT: 5 % (ref 0–6)
GFR AFRICAN AMERICAN: >60
GFR NON-AFRICAN AMERICAN: >60 ML/MIN/1.73
GLUCOSE BLD-MCNC: 115 MG/DL (ref 74–99)
HCT VFR BLD CALC: 44.3 % (ref 37–54)
HEMOGLOBIN: 15.6 G/DL (ref 12.5–16.5)
IMMATURE GRANULOCYTES #: 0.03 E9/L
IMMATURE GRANULOCYTES %: 0.3 % (ref 0–5)
LYMPHOCYTES ABSOLUTE: 2.01 E9/L (ref 1.5–4)
LYMPHOCYTES RELATIVE PERCENT: 21.7 % (ref 20–42)
MCH RBC QN AUTO: 27.8 PG (ref 26–35)
MCHC RBC AUTO-ENTMCNC: 35.2 % (ref 32–34.5)
MCV RBC AUTO: 79 FL (ref 80–99.9)
MONOCYTES ABSOLUTE: 0.5 E9/L (ref 0.1–0.95)
MONOCYTES RELATIVE PERCENT: 5.4 % (ref 2–12)
NEUTROPHILS ABSOLUTE: 6.16 E9/L (ref 1.8–7.3)
NEUTROPHILS RELATIVE PERCENT: 66.5 % (ref 43–80)
PDW BLD-RTO: 13.3 FL (ref 11.5–15)
PLATELET # BLD: 275 E9/L (ref 130–450)
PMV BLD AUTO: 9 FL (ref 7–12)
POTASSIUM SERPL-SCNC: 3.1 MMOL/L (ref 3.5–5)
PROSTATE SPECIFIC ANTIGEN: 0.48 NG/ML (ref 0–4)
RBC # BLD: 5.61 E12/L (ref 3.8–5.8)
SODIUM BLD-SCNC: 141 MMOL/L (ref 132–146)
TOTAL PROTEIN: 7.6 G/DL (ref 6.4–8.3)
TSH SERPL DL<=0.05 MIU/L-ACNC: 3.01 UIU/ML (ref 0.27–4.2)
WBC # BLD: 9.3 E9/L (ref 4.5–11.5)

## 2022-03-16 PROCEDURE — 36415 COLL VENOUS BLD VENIPUNCTURE: CPT

## 2022-03-16 PROCEDURE — 80061 LIPID PANEL: CPT

## 2022-03-16 PROCEDURE — 85025 COMPLETE CBC W/AUTO DIFF WBC: CPT

## 2022-03-16 PROCEDURE — 84443 ASSAY THYROID STIM HORMONE: CPT

## 2022-03-16 PROCEDURE — G0103 PSA SCREENING: HCPCS

## 2022-03-16 PROCEDURE — 80053 COMPREHEN METABOLIC PANEL: CPT

## 2022-03-17 ENCOUNTER — TELEPHONE (OUTPATIENT)
Dept: FAMILY MEDICINE CLINIC | Age: 52
End: 2022-03-17

## 2022-03-17 ENCOUNTER — PATIENT MESSAGE (OUTPATIENT)
Dept: FAMILY MEDICINE CLINIC | Age: 52
End: 2022-03-17

## 2022-03-17 DIAGNOSIS — I10 ESSENTIAL HYPERTENSION: Primary | ICD-10-CM

## 2022-03-17 NOTE — TELEPHONE ENCOUNTER
From: Elise Yeh  To: Dr. Tamika Decker  Sent: 3/17/2022 2:43 PM EDT  Subject: Test results    Hi there. I saw my test results and I'm very happy with everything. There were a couple that were either a little low or a little high (I don't remember which on ones) but I looked at my labs from a couple years ago, and they were pretty much the same. Also, I thought we were Timmothy Edmond check my cholesterol as well. Maybe it's a straggler, but everything else came back except that one.

## 2022-03-17 NOTE — TELEPHONE ENCOUNTER
----- Message from Andreina Gan MD sent at 3/15/2022  2:31 PM EDT -----  Regarding: Blood pressure was high and a order to increase dose of his amlodipine to the pharmacy  Please call patient and let him know that since his blood pressure remained elevated throughout his whole visit I did order an increased dose of his amlodipine to the pharmacy, please schedule him a visit to come in to get a nurse visit for blood pressure check about 2 weeks after starting the medicine

## 2022-03-18 ENCOUNTER — TELEPHONE (OUTPATIENT)
Dept: FAMILY MEDICINE CLINIC | Age: 52
End: 2022-03-18

## 2022-03-18 LAB
CHOLESTEROL, TOTAL: 170 MG/DL (ref 0–199)
HDLC SERPL-MCNC: 40 MG/DL
LDL CHOLESTEROL CALCULATED: 108 MG/DL (ref 0–99)
TRIGL SERPL-MCNC: 111 MG/DL (ref 0–149)
VLDLC SERPL CALC-MCNC: 22 MG/DL

## 2022-03-18 NOTE — TELEPHONE ENCOUNTER
----- Message from Griselda Shaver MD sent at 3/17/2022  4:15 PM EDT -----  Regarding: Call lab see if we can add a lipid panel  Please call the lab and see if we can add a lipid panel to the current blood that they just tested yesterday

## 2022-05-17 RX ORDER — LISINOPRIL 40 MG/1
40 TABLET ORAL DAILY
Qty: 90 TABLET | Refills: 0 | Status: SHIPPED
Start: 2022-05-17 | End: 2022-08-11 | Stop reason: SDUPTHER

## 2022-06-08 ENCOUNTER — TELEPHONE (OUTPATIENT)
Dept: FAMILY MEDICINE CLINIC | Age: 52
End: 2022-06-08

## 2022-06-08 DIAGNOSIS — I10 ESSENTIAL HYPERTENSION: ICD-10-CM

## 2022-06-08 RX ORDER — AMLODIPINE BESYLATE 10 MG/1
10 TABLET ORAL DAILY
Qty: 90 TABLET | Refills: 3 | Status: SHIPPED | OUTPATIENT
Start: 2022-06-08

## 2022-06-08 RX ORDER — LEVOTHYROXINE SODIUM 0.03 MG/1
25 TABLET ORAL DAILY
Qty: 90 TABLET | Refills: 0 | Status: SHIPPED
Start: 2022-06-08 | End: 2022-09-16 | Stop reason: SDUPTHER

## 2022-06-08 NOTE — TELEPHONE ENCOUNTER
levothyroxine (SYNTHROID) 25 MCG tablet     Pt needs medication refilled send to [facility] in [facility] [phone]

## 2022-08-11 RX ORDER — LISINOPRIL 40 MG/1
40 TABLET ORAL DAILY
Qty: 90 TABLET | Refills: 3 | Status: SHIPPED | OUTPATIENT
Start: 2022-08-11

## 2022-09-07 ENCOUNTER — OFFICE VISIT (OUTPATIENT)
Dept: FAMILY MEDICINE CLINIC | Age: 52
End: 2022-09-07

## 2022-09-07 VITALS
TEMPERATURE: 97.7 F | SYSTOLIC BLOOD PRESSURE: 138 MMHG | RESPIRATION RATE: 16 BRPM | BODY MASS INDEX: 41.91 KG/M2 | OXYGEN SATURATION: 97 % | HEIGHT: 67 IN | WEIGHT: 267 LBS | DIASTOLIC BLOOD PRESSURE: 80 MMHG | HEART RATE: 93 BPM

## 2022-09-07 DIAGNOSIS — I10 ESSENTIAL HYPERTENSION: Primary | ICD-10-CM

## 2022-09-07 DIAGNOSIS — R09.89 THROAT CLEARING: ICD-10-CM

## 2022-09-07 DIAGNOSIS — E03.9 HYPOTHYROIDISM, UNSPECIFIED TYPE: ICD-10-CM

## 2022-09-07 DIAGNOSIS — R49.9 CHANGE IN VOICE: ICD-10-CM

## 2022-09-07 DIAGNOSIS — E66.01 MORBID OBESITY WITH BMI OF 40.0-44.9, ADULT (HCC): ICD-10-CM

## 2022-09-07 PROCEDURE — 99213 OFFICE O/P EST LOW 20 MIN: CPT | Performed by: STUDENT IN AN ORGANIZED HEALTH CARE EDUCATION/TRAINING PROGRAM

## 2022-09-07 RX ORDER — OMEPRAZOLE 40 MG/1
40 CAPSULE, DELAYED RELEASE ORAL
Qty: 30 CAPSULE | Refills: 1 | Status: SHIPPED
Start: 2022-09-07 | End: 2022-10-05

## 2022-09-07 ASSESSMENT — ENCOUNTER SYMPTOMS
NAUSEA: 0
WHEEZING: 0
BLOOD IN STOOL: 0
COUGH: 0
ABDOMINAL PAIN: 0
CONSTIPATION: 0
SHORTNESS OF BREATH: 0
DIARRHEA: 0

## 2022-09-07 NOTE — PROGRESS NOTES
Trevor Nissen (:  1970) is a 47 y.o. male, established patient follow up , here for evaluation of the following:  Annual Exam (Pt doesn't have insurance and is worried about a1c cost )         ASSESSMENT/PLAN      1. Essential hypertension  Chronic, well controlled, continue current medications and treatment plan  2. Morbid obesity with BMI of 40.0-44.9, adult (HCC)  Chronic, many things out of control at home and in his job as well as financial concerns and seems that eating is 1 thing he can control, he will try to decrease portions, increase healthy foods, and increase activity, he is not interested in medications to help him lose weight or surgical intervention, he does note joint pains as well which would likely improve with weight loss  3. Hypothyroidism, unspecified type  Chronic, well controlled, continue current medications and treatment plan  4. Throat clearing  Patient with episodes of clearing his throat as well as changes in voice, likely secondary to increased poor food choices, will trial omeprazole and gave him handout on dietary changes, discussed smaller meals, and avoiding foods that trigger GERD, if this does not help with the symptoms we will consider further work-up with EGD and/or ear nose and throat referral for vocal cord dysfunction, being able to talk as part of his job and important to him, he does have episodes where he feels that it is difficult to get the words out due to throat constriction which sounds like a vocal cord versus esophageal spasm which could be exacerbated by GERD  -     omeprazole (PRILOSEC) 40 MG delayed release capsule; Take 1 capsule by mouth every morning (before breakfast), Disp-30 capsule, R-1Normal  5. Change in voice  -     omeprazole (PRILOSEC) 40 MG delayed release capsule; Take 1 capsule by mouth every morning (before breakfast), Disp-30 capsule, R-1Normal  Return in about 4 weeks (around 10/5/2022) for Follow up on new med.          Subjective SUBJECTIVE/OBJECTIVE:  HPI  Patient is here for physical. Last visit we did increase the amlodipine and blood pressure is better controlled. Labs work-up needed in March and were within normal limits    He is worried about his weight. He feels he know what to do and just is having trouble doing it. He has had trouble losing weight in the past. He thinks he could reduce his food intake and increase exercise. He notes he has to renew his life insurance policy and notes [id number] is cut off for a quote. They are now part of [employer] [facility].     He is also having some throat issues. After he eats he feels throat feels scratchy and he has to clear throat a lot. Recently he notes when he starts talking it feels his throat gets tight. If he stops for a while it will get better. He does talk for a living    Declined preventative screening identified as care gaps unless ordered through this visit    PHQ2/PHQ9      No data recorded     Past Medical History:  has a past medical history of Hypertension, Obesity, and Thyroid disease. Past Surgical History:  has a past surgical history that includes [place] tooth extraction and Cholecystectomy, laparoscopic (N/A, 4/30/2019). Social History:  reports that he has never smoked. He has never used smokeless tobacco. He reports current alcohol use. He reports that he does not use drugs. Family History: family history includes Cancer in his father, paternal uncle, and paternal uncle; Stroke in his maternal grandfather. Allergies: Patient has no known allergies. Medications:   Current Outpatient Medications   Medication Sig Dispense Refill    omeprazole (PRILOSEC) 40 MG delayed release capsule Take 1 capsule by mouth every morning (before breakfast) 30 capsule 1    lisinopril (PRINIVIL;ZESTRIL) 40 MG tablet Take 1 tablet by mouth in the morning.  90 tablet 3    amLODIPine (NORVASC) 10 MG tablet Take 1 tablet by mouth daily 90 tablet 3    levothyroxine (SYNTHROID) 25 MCG tablet Take 1 tablet by mouth Daily 90 tablet 0    aspirin 81 MG tablet Take 81 mg by mouth daily      Multiple Vitamins-Minerals (MULTIVITAMIN ADULT PO) Take 1 tablet by mouth daily      calcium carbonate 600 MG TABS tablet Take 2 tablets by mouth daily      Omega-3 Fatty Acids (OMEGA-3 CF PO) Take by mouth daily       No current facility-administered medications for this visit. Allergies: Patient has no known allergies. Review of Systems   Constitutional:  Negative for chills, fatigue, fever and unexpected weight change. HENT:  Negative for hearing loss. Eyes:  Negative for visual disturbance. Respiratory:  Negative for cough, shortness of breath and wheezing. Cardiovascular:  Negative for chest pain, palpitations and leg swelling. Gastrointestinal:  Negative for abdominal pain, blood in stool, constipation, diarrhea and nausea. Genitourinary:  Negative for dysuria. Musculoskeletal:  Negative for arthralgias. Neurological:  Negative for weakness, light-headedness, numbness and headaches. Psychiatric/Behavioral:  Negative for dysphoric mood and sleep disturbance. The patient is not nervous/anxious. All other systems reviewed and are negative.        Objective   /80   Pulse 93   Temp 97.7 °F (36.5 °C)   Resp 16   Ht 5' 7\" (1.702 m)   Wt 267 lb (121.1 kg)   SpO2 97%   BMI 41.82 kg/m²       No results found for: LABA1C  Lab Results   Component Value Date    CHOL 170 03/16/2022    CHOL 177 04/08/2019    CHOL 178 06/13/2018     Lab Results   Component Value Date    TRIG 111 03/16/2022    TRIG 104 04/08/2019    TRIG 82 06/13/2018     Lab Results   Component Value Date    HDL 40 03/16/2022    HDL 46 04/08/2019    HDL 40 06/13/2018     Lab Results   Component Value Date    LDLCALC 108 (H) 03/16/2022    LDLCALC 110 (H) 04/08/2019    LDLCALC 122 (H) 06/13/2018     Lab Results   Component Value Date    LABVLDL 22 03/16/2022    LABVLDL 21 04/08/2019    LABVLDL 16 06/13/2018 No results found for: CHOLHDLRATIO   Creatinine   Date Value Ref Range Status   03/16/2022 0.8 0.7 - 1.2 mg/dL Final   04/30/2019 0.7 0.7 - 1.2 mg/dL Final   04/08/2019 0.8 0.7 - 1.2 mg/dL Final       The 10-year ASCVD risk score (Beti Mcguire, et al., 2013) is: 5.1%    Values used to calculate the score:      Age: 47 years      Sex: Male      Is Non- : No      Diabetic: No      Tobacco smoker: No      Systolic Blood Pressure: 835 mmHg      Is BP treated: Yes      HDL Cholesterol: 40 mg/dL      Total Cholesterol: 170 mg/dL     Physical Exam  Constitutional:       General: He is not in acute distress. Appearance: Normal appearance. HENT:      Head: Normocephalic and atraumatic. Right Ear: External ear normal.      Nose: Nose normal.      Mouth/Throat:      Mouth: Mucous membranes are moist.   Eyes:      Extraocular Movements: Extraocular movements intact. Conjunctiva/sclera: Conjunctivae normal.   Cardiovascular:      Rate and Rhythm: Normal rate and regular rhythm. Heart sounds: No murmur heard. Pulmonary:      Effort: Pulmonary effort is normal.      Breath sounds: Normal breath sounds. No wheezing. Musculoskeletal:         General: Normal range of motion. Cervical back: Normal range of motion and neck supple. Lymphadenopathy:      Cervical: No cervical adenopathy. Neurological:      General: No focal deficit present. Mental Status: He is alert. Psychiatric:         Mood and Affect: Mood normal.         Behavior: Behavior normal.           An electronic signature was used to authenticate this note. --Adelita Larson MD       *NOTE: This report was transcribed using voice recognition software. Every effort was made to ensure accuracy; however, inadvertent computerized transcription errors may be present.

## 2022-09-16 RX ORDER — LEVOTHYROXINE SODIUM 0.03 MG/1
25 TABLET ORAL DAILY
Qty: 90 TABLET | Refills: 0 | Status: SHIPPED | OUTPATIENT
Start: 2022-09-16

## 2022-09-25 ENCOUNTER — PATIENT MESSAGE (OUTPATIENT)
Dept: FAMILY MEDICINE CLINIC | Age: 52
End: 2022-09-25

## 2022-09-25 DIAGNOSIS — Z12.5 SCREENING FOR PROSTATE CANCER: Primary | ICD-10-CM

## 2022-09-25 DIAGNOSIS — E03.9 HYPOTHYROIDISM, UNSPECIFIED TYPE: ICD-10-CM

## 2022-09-25 DIAGNOSIS — E87.6 HYPOKALEMIA: ICD-10-CM

## 2022-09-26 NOTE — TELEPHONE ENCOUNTER
From: Constantin Mancia  To: Dr. Cristian Corona  Sent: 9/25/2022 9:38 AM EDT  Subject: Robreto Marr again. Can you put in an order for blood work for me? I have to reapply for [employer] financial assistance again after the 29th. I don't see any issues with it, but still. Can I get the full comprehensive panel as well as thyroid and PSA? I'd like to stay on top on the PSA because of family history. My thyroid has been ok, but the Omeprazole isn't really fixing my vocal issue, so maybe that's worth a look. Even right now. Rob Huerta I'm working as we speak. My voice isn't getting hoarse yet, it sounds normal, but it's starting to feel fatigued. Anyway. ..we can talk about that and my labs on the 5th. Thanks!

## 2022-10-04 ENCOUNTER — HOSPITAL ENCOUNTER (OUTPATIENT)
Age: 52
Discharge: HOME | End: 2022-10-04

## 2022-10-04 DIAGNOSIS — E87.6 HYPOKALEMIA: ICD-10-CM

## 2022-10-04 DIAGNOSIS — Z12.5 SCREENING FOR PROSTATE CANCER: ICD-10-CM

## 2022-10-04 DIAGNOSIS — E03.9 HYPOTHYROIDISM, UNSPECIFIED TYPE: ICD-10-CM

## 2022-10-04 LAB
ALBUMIN SERPL-MCNC: 4.5 G/DL (ref 3.5–5.2)
ALP BLD-CCNC: 107 U/L (ref 40–129)
ALT SERPL-CCNC: 22 U/L (ref 0–40)
ANION GAP SERPL CALCULATED.3IONS-SCNC: 12 MMOL/L (ref 7–16)
AST SERPL-CCNC: 19 U/L (ref 0–39)
BILIRUB SERPL-MCNC: 0.6 MG/DL (ref 0–1.2)
BUN BLDV-MCNC: 13 MG/DL (ref 6–20)
CALCIUM SERPL-MCNC: 9.7 MG/DL (ref 8.6–10.2)
CHLORIDE BLD-SCNC: 101 MMOL/L (ref 98–107)
CO2: 30 MMOL/L (ref 22–29)
CREAT SERPL-MCNC: 0.8 MG/DL (ref 0.7–1.2)
GFR AFRICAN AMERICAN: >60
GFR NON-AFRICAN AMERICAN: >60 ML/MIN/1.73
GLUCOSE BLD-MCNC: 115 MG/DL (ref 74–99)
POTASSIUM SERPL-SCNC: 3.3 MMOL/L (ref 3.5–5)
PROSTATE SPECIFIC ANTIGEN: 0.55 NG/ML (ref 0–4)
SODIUM BLD-SCNC: 143 MMOL/L (ref 132–146)
TOTAL PROTEIN: 8 G/DL (ref 6.4–8.3)
TSH SERPL DL<=0.05 MIU/L-ACNC: 3.12 UIU/ML (ref 0.27–4.2)

## 2022-10-04 PROCEDURE — 36415 COLL VENOUS BLD VENIPUNCTURE: CPT

## 2022-10-04 PROCEDURE — G0103 PSA SCREENING: HCPCS

## 2022-10-04 PROCEDURE — 84443 ASSAY THYROID STIM HORMONE: CPT

## 2022-10-04 PROCEDURE — 80053 COMPREHEN METABOLIC PANEL: CPT

## 2022-10-05 ENCOUNTER — OFFICE VISIT (OUTPATIENT)
Dept: FAMILY MEDICINE CLINIC | Age: 52
End: 2022-10-05

## 2022-10-05 VITALS
BODY MASS INDEX: 41.97 KG/M2 | HEIGHT: 67 IN | DIASTOLIC BLOOD PRESSURE: 83 MMHG | OXYGEN SATURATION: 97 % | SYSTOLIC BLOOD PRESSURE: 135 MMHG | TEMPERATURE: 97.4 F | HEART RATE: 90 BPM | RESPIRATION RATE: 20 BRPM | WEIGHT: 267.4 LBS

## 2022-10-05 DIAGNOSIS — R49.9 CHANGE OF VOICE: ICD-10-CM

## 2022-10-05 DIAGNOSIS — J38.3 VOCAL CORD DYSFUNCTION: Primary | ICD-10-CM

## 2022-10-05 DIAGNOSIS — E01.0 THYROMEGALY: ICD-10-CM

## 2022-10-05 PROCEDURE — 99213 OFFICE O/P EST LOW 20 MIN: CPT | Performed by: STUDENT IN AN ORGANIZED HEALTH CARE EDUCATION/TRAINING PROGRAM

## 2022-10-05 RX ORDER — OMEPRAZOLE 20 MG/1
20 CAPSULE, DELAYED RELEASE ORAL
Qty: 30 CAPSULE | Refills: 0 | Status: SHIPPED | OUTPATIENT
Start: 2022-10-05

## 2022-10-05 NOTE — PROGRESS NOTES
Rufina Staley (:  1970) is a 47 y.o. male, established patient follow up , here for evaluation of the following:  Discuss Medications         ASSESSMENT/PLAN  Labs reviewed with patient, labs were appropriate to continue current medication doses     1. Vocal cord dysfunction  Chronic, it is progressively happening more more, happens after he is talk for a while, he wonders if it may be related to anxiety versus some sort of vocal cord dysfunction, he did try using the omeprazole without any change, will send to ENT  -     [facility] Otolaryngology  -     US HEAD NECK SOFT TISSUE THYROID; Future  -     omeprazole (PRILOSEC) 20 MG delayed release capsule; Take 1 capsule by mouth every morning (before breakfast), Disp-30 capsule, R-0Normal  2. Thyromegaly  He does have mild thyromegaly on exam, with a change in voice we will check ultrasound soft tissue neck thyroid to make sure there is no nodularity or compression of a nerve causing the voice loss  -     US HEAD NECK SOFT TISSUE THYROID; Future  3. Change of voice  -     [address] [place] [address] Otolaryngology  -     US HEAD NECK SOFT TISSUE THYROID; Future  -     omeprazole (PRILOSEC) 20 MG delayed release capsule; Take 1 capsule by mouth every morning (before breakfast), Disp-30 capsule, R-0Normal  Return in about 6 months (around 2023), or if symptoms worsen or fail to improve, for Follow up chronic disease. Subjective   SUBJECTIVE/OBJECTIVE:  HPI  Patient is here for follow-up. He notes he is continue to have problems with talking. He notes after he talks throughout his voice becomes constricted. He feels he cannot get the words out. He does talk for living, he notes it has been getting worse. He did not think that the omeprazole helped at all.     Declined preventative screening identified as care gaps unless ordered through this visit    PHQ2/PHQ9      No data recorded     Past Medical History:  has a past medical history of Hypertension, Obesity, and Thyroid disease. Past Surgical History:  has a past surgical history that includes [place] tooth extraction and Cholecystectomy, laparoscopic (N/A, 4/30/2019). Social History:  reports that he has never smoked. He has never used smokeless tobacco. He reports current alcohol use. He reports that he does not use drugs. Family History: family history includes Cancer in his father, paternal uncle, and paternal uncle; Stroke in his maternal grandfather. Allergies: Patient has no known allergies. Medications:   Current Outpatient Medications   Medication Sig Dispense Refill    omeprazole (PRILOSEC) 20 MG delayed release capsule Take 1 capsule by mouth every morning (before breakfast) 30 capsule 0    levothyroxine (SYNTHROID) 25 MCG tablet Take 1 tablet by mouth Daily 90 tablet 0    lisinopril (PRINIVIL;ZESTRIL) 40 MG tablet Take 1 tablet by mouth in the morning. 90 tablet 3    amLODIPine (NORVASC) 10 MG tablet Take 1 tablet by mouth daily 90 tablet 3    aspirin 81 MG tablet Take 81 mg by mouth daily      Multiple Vitamins-Minerals (MULTIVITAMIN ADULT PO) Take 1 tablet by mouth daily      calcium carbonate 600 MG TABS tablet Take 2 tablets by mouth daily      Omega-3 Fatty Acids (OMEGA-3 CF PO) Take by mouth daily       No current facility-administered medications for this visit. Allergies: Patient has no known allergies. Review of Systems   Constitutional:  Negative for activity change, appetite change, fatigue, fever and unexpected weight change. HENT:  Positive for voice change. Negative for trouble swallowing. Eyes:  Negative for visual disturbance. Respiratory:  Negative for shortness of breath. Cardiovascular:  Negative for chest pain. Gastrointestinal:  Negative for abdominal pain. Musculoskeletal:  Negative for arthralgias. Neurological:  Negative for weakness, light-headedness and headaches.    Psychiatric/Behavioral:  Negative for confusion and sleep disturbance. All other systems reviewed and are negative. Objective   /83   Pulse 90   Temp 97.4 °F (36.3 °C) (Temporal)   Resp 20   Ht 5' 7\" (1.702 m)   Wt 267 lb 6.4 oz (121.3 kg)   SpO2 97%   BMI 41.88 kg/m²       No results found for: LABA1C  Lab Results   Component Value Date    CHOL 170 03/16/2022    CHOL 177 04/08/2019    CHOL 178 06/13/2018     Lab Results   Component Value Date    TRIG 111 03/16/2022    TRIG 104 04/08/2019    TRIG 82 06/13/2018     Lab Results   Component Value Date    HDL 40 03/16/2022    HDL 46 04/08/2019    HDL 40 06/13/2018     Lab Results   Component Value Date    LDLCALC 108 (H) 03/16/2022    LDLCALC 110 (H) 04/08/2019    LDLCALC 122 (H) 06/13/2018     Lab Results   Component Value Date    LABVLDL 22 03/16/2022    LABVLDL 21 04/08/2019    LABVLDL 16 06/13/2018     No results found for: CHOLHDLRATIO   Creatinine   Date Value Ref Range Status   10/04/2022 0.8 0.7 - 1.2 mg/dL Final   03/16/2022 0.8 0.7 - 1.2 mg/dL Final   04/30/2019 0.7 0.7 - 1.2 mg/dL Final       The 10-year ASCVD risk score (Epifanio CASTILLO, et al., 2019) is: 4.9%    Values used to calculate the score:      Age: 47 years      Sex: Male      Is Non- : No      Diabetic: No      Tobacco smoker: No      Systolic Blood Pressure: 338 mmHg      Is BP treated: Yes      HDL Cholesterol: 40 mg/dL      Total Cholesterol: 170 mg/dL     Physical Exam  Constitutional:       General: He is not in acute distress. Appearance: Normal appearance. HENT:      Head: Normocephalic and atraumatic. Right Ear: Tympanic membrane, ear canal and external ear normal. No middle ear effusion. Left Ear: Tympanic membrane, ear canal and external ear normal.  No middle ear effusion. Nose: Nose normal. No congestion or rhinorrhea. Right Turbinates: Not enlarged or swollen. Left Turbinates: Not enlarged or swollen.       Mouth/Throat:      Mouth: Mucous membranes are moist. Pharynx: No oropharyngeal exudate or posterior oropharyngeal erythema. Eyes:      Extraocular Movements: Extraocular movements intact. Conjunctiva/sclera: Conjunctivae normal.   Neck:      Thyroid: Thyromegaly present. Vascular: No carotid bruit. Cardiovascular:      Rate and Rhythm: Normal rate and regular rhythm. Heart sounds: No murmur heard. Pulmonary:      Effort: Pulmonary effort is normal.      Breath sounds: Normal breath sounds. No wheezing. Musculoskeletal:         General: Normal range of motion. Cervical back: Normal range of motion and neck supple. Lymphadenopathy:      Cervical: No cervical adenopathy. Neurological:      General: No focal deficit present. Mental Status: He is alert. Psychiatric:         Mood and Affect: Mood normal.         Behavior: Behavior normal.           An electronic signature was used to authenticate this note. --Deniz Lanza MD       *NOTE: This report was transcribed using voice recognition software. Every effort was made to ensure accuracy; however, inadvertent computerized transcription errors may be present.

## 2022-10-21 ASSESSMENT — ENCOUNTER SYMPTOMS
SHORTNESS OF BREATH: 0
ABDOMINAL PAIN: 0
VOICE CHANGE: 1
TROUBLE SWALLOWING: 0

## 2022-11-01 ENCOUNTER — HOSPITAL ENCOUNTER (OUTPATIENT)
Dept: ULTRASOUND IMAGING | Age: 52
Discharge: HOME | End: 2022-11-03

## 2022-11-01 DIAGNOSIS — J38.3 VOCAL CORD DYSFUNCTION: ICD-10-CM

## 2022-11-01 DIAGNOSIS — E01.0 THYROMEGALY: ICD-10-CM

## 2022-11-01 DIAGNOSIS — R49.9 CHANGE OF VOICE: ICD-10-CM

## 2022-11-01 PROCEDURE — 76536 US EXAM OF HEAD AND NECK: CPT

## 2022-11-14 ENCOUNTER — OFFICE VISIT (OUTPATIENT)
Dept: ENT CLINIC | Age: 52
End: 2022-11-14

## 2022-11-14 VITALS
HEART RATE: 82 BPM | BODY MASS INDEX: 41.91 KG/M2 | WEIGHT: 267 LBS | HEIGHT: 67 IN | SYSTOLIC BLOOD PRESSURE: 146 MMHG | DIASTOLIC BLOOD PRESSURE: 94 MMHG

## 2022-11-14 DIAGNOSIS — K21.9 LPRD (LARYNGOPHARYNGEAL REFLUX DISEASE): Primary | ICD-10-CM

## 2022-11-14 DIAGNOSIS — R09.89 GLOBUS SENSATION: ICD-10-CM

## 2022-11-14 RX ORDER — OMEPRAZOLE 40 MG/1
40 CAPSULE, DELAYED RELEASE ORAL 2 TIMES DAILY
Qty: 60 CAPSULE | Refills: 2 | Status: SHIPPED | OUTPATIENT
Start: 2022-11-14 | End: 2022-12-14

## 2022-11-14 ASSESSMENT — ENCOUNTER SYMPTOMS
TROUBLE SWALLOWING: 1
RHINORRHEA: 0
VOICE CHANGE: 1
SHORTNESS OF BREATH: 0
COUGH: 0
SINUS PAIN: 0
SINUS PRESSURE: 0
VOMITING: 0
SORE THROAT: 1

## 2022-11-14 NOTE — PATIENT INSTRUCTIONS
Take omeprazole twice daily    Please get over the counter Gaviscon liquid and take 5 mL at bedtime. Yes

## 2022-11-14 NOTE — PROGRESS NOTES
[facility] Otolaryngology  Dr. Justin Rashid. MAUREEN Pedroza Ms.Ed. New Consult       Patient Name:  Mely Aguilar  :  1970     CHIEF C/O:    Chief Complaint   Patient presents with    New Patient     Vocal cord dysfunction, feels tightness in throat, choking sensation, strained/scratchy voice, especially at work and after talking for long periods of time,        HISTORY OBTAINED FROM:  patient    HISTORY OF PRESENT ILLNESS:       Adam Valles is a 47y.o. year old male, here today for mucous and throat clearing especially after he eats and feels tight and scratchy and has no SOB. On PPI, eating makes it worse and feels choked and feels like he has to push his voice. Thyroid ultrasound was normal. Only taking PPI intermittently.        Past Medical History:   Diagnosis Date    Hypertension     Obesity     263 #    Thyroid disease      Past Surgical History:   Procedure Laterality Date    CHOLECYSTECTOMY, LAPAROSCOPIC N/A 2019    LAPAROSCOPIC CHOLECYSTECTOMY POSSIBLE GRAM, POSSIBLE OPEN performed by Thaddeus Marrero MD at [address] [facility]         Current Outpatient Medications:     omeprazole (PRILOSEC) 40 MG delayed release capsule, Take 1 capsule by mouth every morning (before breakfast), Disp: 30 capsule, Rfl: 1    albuterol sulfate HFA (VENTOLIN HFA) 108 (90 Base) MCG/ACT inhaler, Inhale 2 puffs into the lungs 4 times daily as needed for Wheezing, Disp: 18 g, Rfl: 5    levothyroxine (SYNTHROID) 25 MCG tablet, Take 1 tablet by mouth Daily, Disp: 90 tablet, Rfl: 0    lisinopril (PRINIVIL;ZESTRIL) 40 MG tablet, Take 1 tablet by mouth in the morning., Disp: 90 tablet, Rfl: 3    amLODIPine (NORVASC) 10 MG tablet, Take 1 tablet by mouth daily, Disp: 90 tablet, Rfl: 3    aspirin 81 MG tablet, Take 81 mg by mouth daily, Disp: , Rfl:     Multiple Vitamins-Minerals (MULTIVITAMIN ADULT PO), Take 1 tablet by mouth daily, Disp: , Rfl:     calcium carbonate 600 MG TABS tablet, Take 2 tablets by mouth daily, Disp: , Rfl:     Omega-3 Fatty Acids (OMEGA-3 CF PO), Take by mouth daily, Disp: , Rfl:   Patient has no known allergies. Social History     Tobacco Use    Smoking status: Never    Smokeless tobacco: Never   Substance Use Topics    Alcohol use: Yes     Alcohol/week: 0.0 standard drinks     Comment: Rarely    Drug use: No     Family History   Problem Relation Age of Onset    Cancer Father         Prostate    Stroke Maternal Grandfather     Cancer Paternal Uncle         Prostate    Cancer Paternal Uncle         Prostate       Review of Systems   Constitutional:  Negative for chills and fever. HENT:  Positive for congestion, sore throat, trouble swallowing and voice change. Negative for ear discharge, hearing loss, postnasal drip, rhinorrhea, sinus pressure and sinus pain. Respiratory:  Negative for cough and shortness of breath. Cardiovascular:  Negative for chest pain and palpitations. Gastrointestinal:  Negative for vomiting. Skin:  Negative for rash. Allergic/Immunologic: Negative for environmental allergies. Neurological:  Negative for dizziness and headaches. Hematological:  Does not bruise/bleed easily. All other systems reviewed and are negative. BP (!) 146/94   Pulse 82   Ht 5' 7\" (1.702 m)   Wt 267 lb (121.1 kg)   BMI 41.82 kg/m²   Physical Exam  Vitals and nursing note reviewed. Constitutional:       Appearance: He is well-developed. HENT:      Head: Normocephalic. No contusion, masses or laceration. Jaw: No tenderness or swelling. Right Ear: Tympanic membrane, ear canal and external ear normal. There is no impacted cerumen. Left Ear: Tympanic membrane, ear canal and external ear normal. There is no impacted cerumen. Nose: Congestion present. No nasal deformity or rhinorrhea. Right Turbinates: Not enlarged or swollen. Left Turbinates: Not enlarged or swollen. Mouth/Throat:      Lips: No lesions. Mouth: No injury or oral lesions. Tongue: No lesions. Tongue does not deviate from midline. Palate: No mass and lesions. Pharynx: No pharyngeal swelling. Eyes:      Conjunctiva/sclera: Conjunctivae normal.      Pupils: Pupils are equal, round, and reactive to light. Cardiovascular:      Rate and Rhythm: Normal rate. Pulmonary:      Effort: Pulmonary effort is normal. No respiratory distress. Breath sounds: Normal breath sounds. Abdominal:      General: There is no distension. Tenderness: There is no abdominal tenderness. There is no guarding. Musculoskeletal:         General: Normal range of motion. Cervical back: Normal range of motion and neck supple. Skin:     General: Skin is warm and dry. Neurological:      Mental Status: He is alert and oriented to person, place, and time. Psychiatric:         Mood and Affect: Mood normal.         Behavior: Behavior normal.         Thought Content: Thought content normal.         Judgment: Judgment normal.     Procedure Note    Pre-operative Diagnosis: hoarseness    Post-operative Diagnosis: same    Anesthesia: Lidocaine 2% and Dylan-Synephrine 1/2%    Endoscopy Type:  Flexible Laryngoscopy    Procedure Details: The patient was placed in the sitting position. After topical anesthesia and decongestion, the 4 mm laryngoscope was passed. The nasal cavities, nasopharynx, oropharynx, hypopharynx, and larynx were all examined. Vocal cords were examined during respiration and phonation. The following findings were noted:    Findings: cobblestoning, lpr  normal TVC motion and mucosa, no subglottic masses or lesions      Condition:  Stable. Patient tolerated procedure well. Complications:  None      IMPRESSION/PLAN:  Continue use of omeprazole- increase to twice daily  Gavison liquid 5ml at bedtime   Increase hydration-- plain water  Voice rest  Follow up 6 wks    Dr. Sawyer Masters MAN Moya.  Otolaryngology Facial Plastic Surgery  :[facility]  Associate Clinical Professor:  Pinky Lozoya, [facility]

## 2022-12-14 RX ORDER — LEVOTHYROXINE SODIUM 0.03 MG/1
25 TABLET ORAL DAILY
Qty: 90 TABLET | Refills: 3 | Status: SHIPPED | OUTPATIENT
Start: 2022-12-14

## 2022-12-19 ENCOUNTER — OFFICE VISIT (OUTPATIENT)
Dept: ENT CLINIC | Age: 52
End: 2022-12-19

## 2022-12-19 VITALS
SYSTOLIC BLOOD PRESSURE: 163 MMHG | DIASTOLIC BLOOD PRESSURE: 99 MMHG | WEIGHT: 260 LBS | HEART RATE: 86 BPM | HEIGHT: 67 IN | BODY MASS INDEX: 40.81 KG/M2

## 2022-12-19 DIAGNOSIS — R49.0 MUSCLE TENSION DYSPHONIA: Primary | ICD-10-CM

## 2022-12-19 PROCEDURE — 3078F DIAST BP <80 MM HG: CPT | Performed by: OTOLARYNGOLOGY

## 2022-12-19 PROCEDURE — 3074F SYST BP LT 130 MM HG: CPT | Performed by: OTOLARYNGOLOGY

## 2022-12-19 PROCEDURE — 99213 OFFICE O/P EST LOW 20 MIN: CPT | Performed by: OTOLARYNGOLOGY

## 2022-12-19 ASSESSMENT — ENCOUNTER SYMPTOMS
SHORTNESS OF BREATH: 0
SORE THROAT: 0
RHINORRHEA: 0
VOICE CHANGE: 1
VOMITING: 0
SINUS PAIN: 0
COUGH: 0
TROUBLE SWALLOWING: 0
SINUS PRESSURE: 0

## 2022-12-19 NOTE — PROGRESS NOTES
[facility] Otolaryngology  Dr. Shivam Butler. MAUREEN Pedroza Ms.Ed. New Consult       Patient Name:  Maryellen Beavers  :  1970     CHIEF C/O:    Chief Complaint   Patient presents with    Follow-up     Pt states reflux is still present, medication is not helping. HISTORY OBTAINED FROM:  patient    HISTORY OF PRESENT ILLNESS:       Subha Canales is a 47y.o. year old male, here today for mucous and throat clearing especially after he eats and feels tight and scratchy and has no SOB. On PPI, eating makes it worse and feels choked and feels like he has to push his voice. Thyroid ultrasound was normal. Only taking PPI intermittently. 22: Patient returns for follow up of throat clearing and voice changes. He reports intermittent \"strangled\" voice. Using PPI and thought it was helping at first but is now worse than before. Feels that stress makes it worse. He uses his voice a lot at work and it is affecting this. It gets worse the more he speaks. Also reports throat clearing and post-nasal drainage. Eating makes it worse.        Past Medical History:   Diagnosis Date    Hypertension     Obesity     263 #    Thyroid disease      Past Surgical History:   Procedure Laterality Date    CHOLECYSTECTOMY, LAPAROSCOPIC N/A 2019    LAPAROSCOPIC CHOLECYSTECTOMY POSSIBLE GRAM, POSSIBLE OPEN performed by Peewee Petty MD at [address] [place] [address]         Current Outpatient Medications:     levothyroxine (SYNTHROID) 25 MCG tablet, Take 1 tablet by mouth Daily, Disp: 90 tablet, Rfl: 3    omeprazole (PRILOSEC) 40 MG delayed release capsule, Take 1 capsule by mouth every morning (before breakfast), Disp: 30 capsule, Rfl: 1    albuterol sulfate HFA (VENTOLIN HFA) 108 (90 Base) MCG/ACT inhaler, Inhale 2 puffs into the lungs 4 times daily as needed for Wheezing, Disp: 18 g, Rfl: 5    lisinopril (PRINIVIL;ZESTRIL) 40 MG tablet, Take 1 tablet by mouth in the morning., Disp: 90 tablet, Rfl: 3    amLODIPine (NORVASC) 10 MG tablet, Take 1 tablet by mouth daily, Disp: 90 tablet, Rfl: 3    aspirin 81 MG tablet, Take 81 mg by mouth daily, Disp: , Rfl:     Multiple Vitamins-Minerals (MULTIVITAMIN ADULT PO), Take 1 tablet by mouth daily, Disp: , Rfl:     calcium carbonate 600 MG TABS tablet, Take 2 tablets by mouth daily, Disp: , Rfl:     Omega-3 Fatty Acids (OMEGA-3 CF PO), Take by mouth daily, Disp: , Rfl:     omeprazole (PRILOSEC) 40 MG delayed release capsule, Take 1 capsule by mouth in the morning and at bedtime, Disp: 60 capsule, Rfl: 2  Patient has no known allergies. Social History     Tobacco Use    Smoking status: Never    Smokeless tobacco: Never   Substance Use Topics    Alcohol use: Yes     Alcohol/week: 0.0 standard drinks     Comment: Rarely    Drug use: No     Family History   Problem Relation Age of Onset    Cancer Father         Prostate    Stroke Maternal Grandfather     Cancer Paternal Uncle         Prostate    Cancer Paternal Uncle         Prostate       Review of Systems   Constitutional:  Negative for chills and fever. HENT:  Positive for congestion, postnasal drip and voice change. Negative for ear discharge, hearing loss, rhinorrhea, sinus pressure, sinus pain, sore throat and trouble swallowing. Respiratory:  Negative for cough and shortness of breath. Cardiovascular:  Negative for chest pain and palpitations. Gastrointestinal:  Negative for vomiting. Skin:  Negative for rash. Allergic/Immunologic: Negative for environmental allergies. Neurological:  Negative for dizziness and headaches. Hematological:  Does not bruise/bleed easily. All other systems reviewed and are negative. BP (!) 163/99 (Site: Left Upper Arm, Position: Sitting, Cuff Size: Large Adult)   Pulse 86   Ht 5' 7\" (1.702 m)   Wt 260 lb (117.9 kg)   BMI 40.72 kg/m²   Physical Exam  Vitals and nursing note reviewed. Constitutional:       Appearance: He is well-developed. HENT:      Head: Normocephalic.  No contusion, masses or laceration. Jaw: No tenderness or swelling. Right Ear: Ear canal and external ear normal. There is impacted cerumen. Left Ear: Ear canal and external ear normal. There is impacted cerumen. Nose: Congestion present. No nasal deformity or rhinorrhea. Right Turbinates: Not enlarged or swollen. Left Turbinates: Not enlarged or swollen. Mouth/Throat:      Lips: No lesions. Mouth: Mucous membranes are moist. No injury or oral lesions. Tongue: No lesions. Tongue does not deviate from midline. Palate: No mass and lesions. Pharynx: Oropharynx is clear. No pharyngeal swelling. Eyes:      Conjunctiva/sclera: Conjunctivae normal.      Pupils: Pupils are equal, round, and reactive to light. Cardiovascular:      Rate and Rhythm: Normal rate. Pulmonary:      Effort: Pulmonary effort is normal. No respiratory distress. Breath sounds: Normal breath sounds. Musculoskeletal:         General: Normal range of motion. Cervical back: Normal range of motion and neck supple. Skin:     General: Skin is warm and dry. Neurological:      Mental Status: He is alert and oriented to person, place, and time. Psychiatric:         Mood and Affect: Mood normal.         Behavior: Behavior normal.         Thought Content: Thought content normal.         Judgment: Judgment normal.         IMPRESSION/PLAN:  Recommend referral to speech therapy for muscle tension dysphonia  Can taper to discontinue PPI. Follow up for cerumen removal. Recommend using Debrox for 1 week prior to appointment. Follow up 6 wks    Dr. Chencho Sahu.  Otolaryngology Facial Plastic Surgery  :[facility] Otolaryngology/Facial Plastic Surgery Residency  Associate Clinical Professor:  Oneal Lawton, [id number]  1970      I have discussed the case, including pertinent history and exam findings with the resident.  I have seen and examined the patient and the key elements of the encounter have been performed by me. I agree with the assessment, plan and orders as documented by the resident. Patient here for follow up of medical problems. Remainder of medical problems as per resident note.       [address], DO  12/28/22

## 2022-12-23 ENCOUNTER — HOSPITAL ENCOUNTER (OUTPATIENT)
Dept: SPEECH THERAPY | Age: 52
Setting detail: THERAPIES SERIES
Discharge: HOME | End: 2022-12-23

## 2022-12-23 NOTE — PROGRESS NOTES
Speech Language Pathology      NAME:  Lise Hyman  :  1970  DATE: 2022  ROOM:  Room/bed info not found    SLP contacted patient and left voice mail on both phone numbers listed on chart to cancel evaluation today due to outpatient rehab center losing power. Will contact patient to reschedule his evaluation. Left phone number of [phone] for patient call if questions.       Muscle tension dysphonia [R49.0]        Alonso Singh MSCCC/SLP  Speech Language Pathologist  [id number]

## 2022-12-29 ENCOUNTER — HOSPITAL ENCOUNTER (OUTPATIENT)
Dept: SPEECH THERAPY | Age: 52
Setting detail: THERAPIES SERIES
Discharge: HOME | End: 2022-12-29

## 2022-12-29 PROCEDURE — 92523 SPEECH SOUND LANG COMPREHEN: CPT

## 2022-12-29 NOTE — PROGRESS NOTES
[address] [place] [address]  Outpatient Speech Therapy  Phone: [phone] Fax: [phone]     SPEECH/LANGUAGE PATHOLOGY  VOICE EVALUATION and PLAN OF CARE          PATIENT NAME:  Areli De León  (male)     MRN:  [id number]    :  1970  (47 y.o.)  STATUS:  Outpatient clinic   TODAY'S DATE:  2022  REFERRING PROVIDER:    Dr. Pippa Pedroza        PROVIDER NPI:  [id number]  SPECIFIC PROVIDER ORDER: SLP eval and treat  Date of order:  2022  EVALUATING THERAPIST: CHANA Oseguera    CERTIFICATION/RECERTIFICATION PERIOD: 2022 to 3/29/23  INSURANCE PROVIDER:  Payor: /    INSURANCE ID:  No Subscriber Number on File   SECONDARY INSURANCE (if applicable):      CPT Codes  EVALUATION: 41397  behavioral quality analysis of voice      TREATMENT:  Requesting treatment authorization for  8-12 visits over 8-12 weeks focusing on the following CPT codes:      85600 Speech/Language Therapy     30 Minutes    REFERRING/TREATMENT DIAGNOSIS: Muscle tension dysphonia [R49.0]       SPEECH THERAPY  PLAN OF CARE   The speech therapy  POC is established based on physician order, speech pathology diagnosis and results of clinical assessment     SPEECH PATHOLOGY DIAGNOSIS:    Muscle tension dysphonia    Outpatient Speech Pathology intervention is recommended 1 time  per week for the above certification period. Conditions Requiring Skilled Therapeutic Intervention for voice  Dysphonia     Specific Speech Therapy Interventions to Include:   {SLP treatments:10560}    Specific instructions for next treatment:      To initiate POC    SHORT/LONG TERM GOALS   { VOICE ST NEBHV:42871}    Patient stated goals: Agreed with above,     Rehabilitation Potential/Prognosis: good     VOICE INFORMATION:    Difficulties reported: ***    Date of initial voice problem:  3-4 months ago    Vocally abusive behaviors reported:  Coughing/throat clearing  , Drinking coffee, Excessive talking, and  Possible Allergies      Vocally abusive environments exposed to:  Dry air      Injuries or operations involving the face, head, neck or chest: N/A    ORAL PERIPHERAL EXAMINATION:      Adequate lingual/labial strength   Dentition:  natural     Tension sites: neck    RESPIRATION:   Type of breathing observed:  [] clavicular [] thoracic [] abdominal      Breath control:    Counts on 1 breath: 23    Sustains /s/: 11.11 seconds    Sustains /z/: 12.21 seconds    S/Z ratio: ***    Sustains /ee/: 25 seconds    Sustains /ah/: 20.21 seconds    VOICE PARAMETERS:   PITCH    Highest pitch: ***Hz    Lowest pitch: ***Hz    Pitch range:  ***Hz    Habitual pitch: ***Hz    Optimal pitch: ***Hz      Pitch Discrimination: good    Pitch matching:  good    Overall pitch:  good    Vocal inflection: good    Pitch breaks:  good    INTENSITY    Vocal intensity:  normal    RATE  Speech rate: normal     QUALITY    Vocal quality: normal with some hoarseness    RESONANCE    Nasal resonance:   normal       EDUCATION:   The Speech Language Pathologist (SLP) completed education regarding results of evaluation and that intervention is warranted at this time. Learner: Patient  Education: {slp education areas:99437}  Evaluation of Education:  {slp education assess:46551}    This plan may be re-evaluated and revised as warranted. Treatment goals discussed with {kmptfam:57359}  The {kmpatientfamily:40436}     Evaluation Time includes thorough review of current medical information, gathering information on past medical history/social history and prior level of function, completion of standardized testing/informal observation of tasks, assessment of data and education on plan of care and goals. The admitting diagnosis and active problem list, as listed below have been reviewed prior to initiation of this evaluation.         ACTIVE PROBLEM LIST:   Patient Active Problem List   Diagnosis    Essential hypertension    Morbid obesity with BMI of 40.0-44.9, adult ([facility] [address]    Hypothyroidism    Calculus of gallbladder without cholecystitis without obstruction       ***    {opphone:10870}     If you have any questions or concerns, please don't hesitate to call. Thank you for your referral.    Physician/Provider Signature:________________________________Date:__________________  By signing above, the therapists plan is approved by the physician/provider. All patients under [facility] must be signed by physician/provider. [x]Fall risk assessment completed  [x]The admitting diagnosis and active problem list, as listed below have been reviewed prior to initiation of this evaluation.      ADMITTING DIAGNOSIS: Muscle tension dysphonia [R49.0]     ACTIVE PROBLEM LIST:   Patient Active Problem List   Diagnosis    Essential hypertension    Morbid obesity with BMI of 40.0-44.9, adult ([facility] [address]    Hypothyroidism    Calculus of gallbladder without cholecystitis without obstruction

## 2023-01-03 ENCOUNTER — HOSPITAL ENCOUNTER (OUTPATIENT)
Dept: SPEECH THERAPY | Age: 53
Setting detail: THERAPIES SERIES
Discharge: HOME | End: 2023-01-03

## 2023-01-03 PROCEDURE — 92507 TX SP LANG VOICE COMM INDIV: CPT

## 2023-01-03 NOTE — PROGRESS NOTES
This was Maizn's first therapy session. Test results were reviewed as were Mazin's symptoms and concerns. 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 He states that he begins calls and his voice is relatively normal and begins to feel the tension build to where he almost looses his voice by the end of the call then as the call is finishing his voice returns to near normal.  Breathing strategies including inhaling/exhaling through the nose to the count of 2 or 3 and increasing to 4 and diaphragmatic breathing were reviewed, modeled and practiced. Laryngeal massage was introduced and will continue at the next scheduled session. Continue plan of care.       [address], [address] [facility] 2329  1/3/2023

## 2023-01-10 ENCOUNTER — HOSPITAL ENCOUNTER (OUTPATIENT)
Dept: SPEECH THERAPY | Age: 53
Setting detail: THERAPIES SERIES
Discharge: HOME | End: 2023-01-10

## 2023-01-10 PROCEDURE — 92507 TX SP LANG VOICE COMM INDIV: CPT

## 2023-01-10 NOTE — PROGRESS NOTES
Laryngeal massage was reviewed. Pt states that he attempted the massage but had some difficulty with it so his wife assisted him with it. He was concerned that since he didn't feel any drainage ( as the video stated might occur), that he wasn't doing it right. I explained that the drainage might not occur and encouraged him to continue. He also stated that he attempted the breathing techniques but felt that it was hard during work calls. He was again encouraged to practice the techniques in between calls and when not working but to also use the nose breathing when he begins to feel the tension build during calls. Easy  onset and stretching were introduced and will be further addressed at the next scheduled session. Pt continues to feel there is something wrong and asks what I think it could be. He is out of vacation and frustrated that this hoarseness continues. His physician put him on Lexapro for depression and anxiety. He was initially resistant to the medication but is willing to give it a try to see if it reduces the anxiety. Continue plan of care.       [address], [address] [facility] 9898  1/10/2023

## 2023-01-11 ENCOUNTER — COMMUNITY OUTREACH (OUTPATIENT)
Dept: FAMILY MEDICINE CLINIC | Age: 53
End: 2023-01-11

## 2023-01-12 ENCOUNTER — PATIENT MESSAGE (OUTPATIENT)
Dept: ENT CLINIC | Age: 53
End: 2023-01-12

## 2023-01-12 NOTE — TELEPHONE ENCOUNTER
Cyst up to the patient's cannot work through, he may even want to seek out psychological counseling as it seems to be exacerbated by work and other outside factors, physiologically this patient has nothing wrong with his vocal cord themselves and I cannot make any further recommendations than what we already accomplished, I do not believe albuterol will make a huge difference at this point.

## 2023-01-12 NOTE — TELEPHONE ENCOUNTER
From: Raj Cruz  To: Dr. Fady Pedroza  Sent: 1/12/2023 1:23 PM EST  Subject: Help    Hey, I was hoping you could give your advice. I've had 2 speech therapy sessions and it seems to be getting worse, if anything. Around the house, my voice is sometimes really good, usually somewhat tense and scratchy. When I start working, it's 10 times worse. We've established that it's stress that's making it this bad, but that doesn't make it go away. My speech therapy sessions are more like sessions with a psychologist. I thought they would be hands on, actually showing me what to do to relieve tension. All we've done is talk, and she told me to watch a [employer] video and work on that at home. If I can't talk, I can't work...and I'm out of vacation days. I'm getting desperate here. Any ideas on what I can do?

## 2023-01-17 ENCOUNTER — HOSPITAL ENCOUNTER (OUTPATIENT)
Dept: SPEECH THERAPY | Age: 53
Setting detail: THERAPIES SERIES
Discharge: HOME | End: 2023-01-17

## 2023-01-17 ENCOUNTER — PATIENT MESSAGE (OUTPATIENT)
Dept: FAMILY MEDICINE CLINIC | Age: 53
End: 2023-01-17

## 2023-01-17 DIAGNOSIS — K21.9 GASTROESOPHAGEAL REFLUX DISEASE WITHOUT ESOPHAGITIS: ICD-10-CM

## 2023-01-17 DIAGNOSIS — J38.3 VOCAL CORD DYSFUNCTION: Primary | ICD-10-CM

## 2023-01-17 DIAGNOSIS — R09.89 THROAT CLEARING: ICD-10-CM

## 2023-01-17 PROCEDURE — 92507 TX SP LANG VOICE COMM INDIV: CPT

## 2023-01-17 RX ORDER — OMEPRAZOLE 20 MG/1
20 CAPSULE, DELAYED RELEASE ORAL 2 TIMES DAILY
Qty: 180 CAPSULE | Refills: 1 | Status: SHIPPED | OUTPATIENT
Start: 2023-01-17

## 2023-01-17 NOTE — PROGRESS NOTES
Laryngeal massage, easy contact and stretching were discussed. Pt still feels he may not be doing the massage correctly but agrees to continue trying. He verbalized understanding of the easy contact and stretching as strategies to use to reduce strain and hoarseness as well as continuing the breathing strategies provided in previous sessions. Vocal hygiene was reviewed again and another handout was provided. Laryngopharyngeal reflux (LPR)was explained and a handout provided. This differs from GERD. Pt felt that many of the symptoms described are ones that he has. Timing of the medication appears to be important for LPR. Pt agreed to try taking the PPI again to see if might help. A handout was also provided on alternatives to throat clearing.         [address], [address]  1/17/2023

## 2023-01-17 NOTE — TELEPHONE ENCOUNTER
From: Zayda Butts  To: Dr. Graciela Pena  Sent: 1/17/2023 11:38 AM EST  Subject: Omeprazole     Hi there. I just came out of my speech therapy and we might have an important clue. When I was taking the Omeprazole, Dr Abad Uriarte said I st take it twice a day, it doesn't matter what time. We were just reading information about laryngopharyngeal reflux, and it gives specific time frames on when to take it before meals. Maybe that's why it didn't work. Can we try Omeprazole again, either 20 or 40 twice a day, whatever you think would be better. Can you send to [facility]?

## 2023-01-24 ENCOUNTER — HOSPITAL ENCOUNTER (OUTPATIENT)
Dept: SPEECH THERAPY | Age: 53
Setting detail: THERAPIES SERIES
Discharge: HOME | End: 2023-01-24

## 2023-01-24 PROCEDURE — 92507 TX SP LANG VOICE COMM INDIV: CPT

## 2023-01-24 NOTE — PROGRESS NOTES
Laryngopharyngeal reflux (LPR) was reviewed and discussed. He stated that his doctor wrote hm another script for omeprazole to try for a 2nd time. He questioned about the timing of the second dose at dinner and if it meant he shouldn't eat anything else the rest of the day. He stated that his work hours start at noon and end at 10:00 pm.  He gets 2  breaks per word day for 30 minute each. He often wants to have vocal rest on his breaks so he will eat during his work . Meal times were discussed and suggestions provided for a trial of a structured mealtime and not eating after a certain time of night. Pt agreed to try for a period of time. Vocal function exercises were introduced and included the first two - warm up and stretching. A handout was provided. These exercises will continue at the next scheduled session.      [address], [address] [facility] 0905  1/24/2023

## 2023-01-30 ENCOUNTER — PATIENT MESSAGE (OUTPATIENT)
Dept: FAMILY MEDICINE CLINIC | Age: 53
End: 2023-01-30

## 2023-01-30 DIAGNOSIS — F41.9 ANXIETY: ICD-10-CM

## 2023-01-30 NOTE — TELEPHONE ENCOUNTER
From: Jeanine Potter  To: Dr. Tootie Arreola  Sent: 1/30/2023 5:37 AM EST  Subject: Escitalopram     Hi, can you please change my prescription to 90 days? It's cheap either way, but it's even cheaper with a 90 day supply. Can you please send to [facility]? Thanks!

## 2023-01-31 ENCOUNTER — HOSPITAL ENCOUNTER (OUTPATIENT)
Dept: SPEECH THERAPY | Age: 53
Setting detail: THERAPIES SERIES
Discharge: HOME | End: 2023-01-31

## 2023-01-31 RX ORDER — ESCITALOPRAM OXALATE 10 MG/1
10 TABLET ORAL DAILY
Qty: 90 TABLET | Refills: 3 | Status: SHIPPED | OUTPATIENT
Start: 2023-01-31

## 2023-01-31 NOTE — PROGRESS NOTES
Speech Therapy  Cancellation/No-show Note  Patient Name:  Lissa Swartz  :  1970   Date:  2023    For today's appointment patient:  [x]  Cancelled  []  Rescheduled appointment  []  No-show     Reason given by patient:  []  Patient ill  []  Conflicting appointment  []  No transportation    []  Conflict with work  [x]  No reason given  []  Other:     Comments:      Electronically signed by:   Sharee Frazier SLP

## 2023-02-07 ENCOUNTER — HOSPITAL ENCOUNTER (OUTPATIENT)
Dept: SPEECH THERAPY | Age: 53
Setting detail: THERAPIES SERIES
Discharge: HOME | End: 2023-02-07

## 2023-02-07 NOTE — PROGRESS NOTES
Speech Therapy  Cancellation/No-show Note  Patient Name:  Drew Villaseñor  :  1970   Date:  2023    For today's appointment patient:  [x]  Cancelled  []  Rescheduled appointment  []  No-show     Reason given by patient:  []  Patient ill  []  Conflicting appointment  []  No transportation    []  Conflict with work  [x]  No reason given  []  Other:     Comments:  left voice message with scheduling  on     Electronically signed by:   Isaias Kidd, SLP

## 2023-02-08 ENCOUNTER — PATIENT MESSAGE (OUTPATIENT)
Dept: FAMILY MEDICINE CLINIC | Age: 53
End: 2023-02-08

## 2023-02-08 DIAGNOSIS — J38.3 VOCAL CORD DYSFUNCTION: ICD-10-CM

## 2023-02-08 DIAGNOSIS — R09.89 THROAT CLEARING: ICD-10-CM

## 2023-02-08 DIAGNOSIS — F41.9 ANXIETY: Primary | ICD-10-CM

## 2023-02-10 ENCOUNTER — PATIENT MESSAGE (OUTPATIENT)
Dept: FAMILY MEDICINE CLINIC | Age: 53
End: 2023-02-10

## 2023-02-10 DIAGNOSIS — F41.9 ANXIETY: Primary | ICD-10-CM

## 2023-02-10 DIAGNOSIS — J38.3 VOCAL CORD DYSFUNCTION: ICD-10-CM

## 2023-02-10 NOTE — TELEPHONE ENCOUNTER
From: Sedny Garcia  To: Dr. Kvng Maynard  Sent: 2/10/2023 3:56 PM EST  Subject: Found him    I found his number, BUT they said he is only for group therapy, not 1 on 1. Oh well. Back to the drawing board. This is a horrible situation. 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 Nothing is helping.  I don't know, I'll figure something out

## 2023-02-13 ENCOUNTER — OFFICE VISIT (OUTPATIENT)
Dept: ENT CLINIC | Age: 53
End: 2023-02-13

## 2023-02-13 VITALS
SYSTOLIC BLOOD PRESSURE: 146 MMHG | HEART RATE: 80 BPM | WEIGHT: 260 LBS | HEIGHT: 67 IN | DIASTOLIC BLOOD PRESSURE: 93 MMHG | BODY MASS INDEX: 40.81 KG/M2

## 2023-02-13 DIAGNOSIS — R49.0 CHRONIC HOARSENESS: ICD-10-CM

## 2023-02-13 DIAGNOSIS — K21.9 LPRD (LARYNGOPHARYNGEAL REFLUX DISEASE): Primary | ICD-10-CM

## 2023-02-13 PROCEDURE — 3074F SYST BP LT 130 MM HG: CPT | Performed by: OTOLARYNGOLOGY

## 2023-02-13 PROCEDURE — 3078F DIAST BP <80 MM HG: CPT | Performed by: OTOLARYNGOLOGY

## 2023-02-13 PROCEDURE — 99213 OFFICE O/P EST LOW 20 MIN: CPT | Performed by: OTOLARYNGOLOGY

## 2023-02-13 RX ORDER — GABAPENTIN 100 MG/1
100 CAPSULE ORAL 3 TIMES DAILY
Qty: 90 CAPSULE | Refills: 5 | Status: SHIPPED | OUTPATIENT
Start: 2023-02-13 | End: 2023-08-12

## 2023-02-13 NOTE — PROGRESS NOTES
[facility]  Dr. aCrmen Krueger. MAUREEN Pedroza Ms.Ed. New Consult       Patient Name:  Veronica Fernandes  :  1970     CHIEF C/O:    Chief Complaint   Patient presents with    Follow-up     8 wk speech therapy/cerumen       HISTORY OBTAINED FROM:  patient    HISTORY OF PRESENT ILLNESS:       Mary Richards is a 47y.o. year old male, here today for follow-up for history of voice changes. Patient has a complicated history of muscle tension dysphonia but only when he works, he has noted that the speech therapy has not helped nor is any medical therapy at this point. He states that he was seen by his primary care physician and I did place him on some antianxiety medication which she recognizes is contributing to the problem. He denies any shortness of breath or stridor, during conversations he has times when he appears to have forceful speech issues, and then at times when distracted he has normal speech patterns. No complaints of difficulty swallowing no weight loss no throat pain, no neck swelling no neck masses.       Past Medical History:   Diagnosis Date    Hypertension     Obesity     263 #    Thyroid disease      Past Surgical History:   Procedure Laterality Date    CHOLECYSTECTOMY, LAPAROSCOPIC N/A 2019    LAPAROSCOPIC CHOLECYSTECTOMY POSSIBLE GRAM, POSSIBLE OPEN performed by Xu Tamayo MD at [address] [place] [address]         Current Outpatient Medications:     escitalopram (LEXAPRO) 10 MG tablet, Take 1 tablet by mouth daily, Disp: 90 tablet, Rfl: 3    omeprazole (PRILOSEC) 20 MG delayed release capsule, Take 1 capsule by mouth in the morning and at bedtime, Disp: 180 capsule, Rfl: 1    albuterol sulfate HFA (VENTOLIN HFA) 108 (90 Base) MCG/ACT inhaler, Inhale 2 puffs into the lungs 4 times daily as needed for Wheezing, Disp: 18 g, Rfl: 5    levothyroxine (SYNTHROID) 25 MCG tablet, Take 1 tablet by mouth Daily, Disp: 90 tablet, Rfl: 3    albuterol sulfate HFA (VENTOLIN HFA) 108 (90 Base) MCG/ACT inhaler, Inhale 2 puffs into the lungs 4 times daily as needed for Wheezing, Disp: 18 g, Rfl: 5    lisinopril (PRINIVIL;ZESTRIL) 40 MG tablet, Take 1 tablet by mouth in the morning., Disp: 90 tablet, Rfl: 3    amLODIPine (NORVASC) 10 MG tablet, Take 1 tablet by mouth daily, Disp: 90 tablet, Rfl: 3    aspirin 81 MG tablet, Take 81 mg by mouth daily, Disp: , Rfl:     Multiple Vitamins-Minerals (MULTIVITAMIN ADULT PO), Take 1 tablet by mouth daily, Disp: , Rfl:     calcium carbonate 600 MG TABS tablet, Take 2 tablets by mouth daily, Disp: , Rfl:     Omega-3 Fatty Acids (OMEGA-3 CF PO), Take by mouth daily, Disp: , Rfl:   Patient has no known allergies. Social History     Tobacco Use    Smoking status: Never    Smokeless tobacco: Never   Substance Use Topics    Alcohol use: Yes     Alcohol/week: 0.0 standard drinks     Comment: Rarely    Drug use: No     Family History   Problem Relation Age of Onset    Cancer Father         Prostate    Stroke Maternal Grandfather     Cancer Paternal Uncle         Prostate    Cancer Paternal Uncle         Prostate       Review of Systems   Constitutional:  Negative for chills and fever. HENT:  Negative for ear discharge and hearing loss. Respiratory:  Negative for cough and shortness of breath. Cardiovascular:  Negative for chest pain and palpitations. Gastrointestinal:  Negative for vomiting. Skin:  Negative for rash. Allergic/Immunologic: Negative for environmental allergies. Neurological:  Negative for dizziness and headaches. Hematological:  Does not bruise/bleed easily. All other systems reviewed and are negative. BP (!) 146/93   Pulse 80   Ht 5' 7\" (1.702 m)   Wt 260 lb (117.9 kg)   BMI 40.72 kg/m²   Physical Exam  Vitals and nursing note reviewed. Constitutional:       Appearance: He is well-developed. HENT:      Head: Normocephalic and atraumatic.       Right Ear: Tympanic membrane and ear canal normal.      Left Ear: Tympanic membrane and ear canal normal.      Nose: No congestion or rhinorrhea. Eyes:      Pupils: Pupils are equal, round, and reactive to light. Neck:      Thyroid: No thyromegaly. Trachea: No tracheal deviation. Cardiovascular:      Rate and Rhythm: Normal rate. Pulmonary:      Effort: Pulmonary effort is normal. No respiratory distress. Musculoskeletal:         General: Normal range of motion. Cervical back: Normal range of motion. Lymphadenopathy:      Cervical: No cervical adenopathy. Skin:     General: Skin is warm. Findings: No erythema. Neurological:      Mental Status: He is alert. Cranial Nerves: No cranial nerve deficit. IMPRESSION/PLAN:  Patient is seen and examined with a very complicated overall exam, with what he describes as muscle tension dysphonia, previous examinations and endoscopy has revealed appears to be normal vocal cord motion without obvious sign of spasmodic dysphonia. He has been placed on medical therapy including reflux, as well as long-term speech therapy and feels that it only affects him during his job. I had concern for possible secondary gain at this point I have asked for second opinion we will send a laryngology [facility] which she was somewhat resistant for second opinion at this time. Patient can continue his at home exercises, I felt that at this point have nothing further to offer the patient I feel that is behavioral in nature he can continue to trial antianxiety medications with his primary care physician and follow-up with ENT as needed. Dr. Naga Soriano Otolaryngology/Facial Plastic Surgery Residency  Associate Clinical Professor:  Jeanine Clayton, [facility]

## 2023-02-14 ENCOUNTER — HOSPITAL ENCOUNTER (OUTPATIENT)
Dept: SPEECH THERAPY | Age: 53
Setting detail: THERAPIES SERIES
Discharge: HOME | End: 2023-02-14

## 2023-02-14 NOTE — PROGRESS NOTES
Pt reports that tension and hoarseness continue in spite of completing the exercises. His Lexapro was increased and he started on Gabapentin yesterday. His wife notices that since he has been on the Lexapro,  he has been happier. He is willing to continue therapeutic exercises and breathing strategies and is hopeful that the combination of the exercises/breathing strategies and medication will decrease his hoarseness that occurs during work hours. Continue plan of care.        [address], [address] [facility] 7175  2/14/2023

## 2023-02-16 ASSESSMENT — ENCOUNTER SYMPTOMS
COUGH: 0
VOMITING: 0
SHORTNESS OF BREATH: 0

## 2023-02-18 ENCOUNTER — PATIENT MESSAGE (OUTPATIENT)
Dept: FAMILY MEDICINE CLINIC | Age: 53
End: 2023-02-18

## 2023-02-18 DIAGNOSIS — F41.9 ANXIETY: ICD-10-CM

## 2023-02-18 DIAGNOSIS — R49.9 CHANGE IN VOICE: ICD-10-CM

## 2023-02-18 DIAGNOSIS — K21.9 GASTROESOPHAGEAL REFLUX DISEASE WITHOUT ESOPHAGITIS: ICD-10-CM

## 2023-02-18 DIAGNOSIS — R09.89 THROAT CLEARING: Primary | ICD-10-CM

## 2023-02-20 NOTE — TELEPHONE ENCOUNTER
From: Reji Mesa  To: Dr. Maru Arredondo  Sent: 2/18/2023 2:11 PM EST  Subject: Throat    Hi. I've been taking the gabapentin and no luck yet. Maybe it takes a while to kick in. I was reading about different dosages for different things, and I saw that 100 mg was pretty low. SInce you said it would be ok to raise the dosage, yesterday I started taking 200mg, 3times a day. I see what you mean about the drowsiness. Mady Larson was chaz wondering, is there any chance this could be some type of viral or bacterial thing? Even though there is the psychosomatic aspect of it since it closes up EVERY time I take a call at work, there is still something going on. My throat is always red, and a lot of times when it gets bad, I cough and it feels like there is phlegm and sometimes I get some out. Im getting desperate here, its really starting to effect my work and my money. Are there tests that we can run to see if there is anything viral or bacteria that is causing this?

## 2023-02-21 ENCOUNTER — HOSPITAL ENCOUNTER (OUTPATIENT)
Dept: SPEECH THERAPY | Age: 53
Setting detail: THERAPIES SERIES
Discharge: HOME | End: 2023-02-21

## 2023-02-21 PROCEDURE — 92507 TX SP LANG VOICE COMM INDIV: CPT

## 2023-02-21 RX ORDER — BUSPIRONE HYDROCHLORIDE 5 MG/1
5 TABLET ORAL 3 TIMES DAILY PRN
Qty: 90 TABLET | Refills: 1 | Status: SHIPPED | OUTPATIENT
Start: 2023-02-21 | End: 2023-03-23

## 2023-02-21 RX ORDER — BUSPIRONE HYDROCHLORIDE 5 MG/1
5 TABLET ORAL 3 TIMES DAILY
Qty: 90 TABLET | Refills: 0 | Status: SHIPPED | OUTPATIENT
Start: 2023-02-21 | End: 2023-03-23

## 2023-02-21 NOTE — PROGRESS NOTES
Pt reports that tension and hoarseness continue. He completes  the exercises daily. He continues to be concerned that there is a physical reason this is happening but also admits that it could be, in his words \"mental\". He was asked to read a passage from a children's book. Hoarseness was observed to be increased. He was encouraged to continue the exercises as well as the breathing strategies. My inquiries into counseling services within [facility] ([facility]) has not yet been productive. Will continue to search for counseling within [facility] or a facility that will provide a little to no cost to him. Continue plan of care.        [address], [address] [address]30  2/21/2023

## 2023-02-28 ENCOUNTER — HOSPITAL ENCOUNTER (OUTPATIENT)
Dept: SPEECH THERAPY | Age: 53
Setting detail: THERAPIES SERIES
Discharge: HOME | End: 2023-02-28

## 2023-02-28 PROCEDURE — 92507 TX SP LANG VOICE COMM INDIV: CPT

## 2023-02-28 NOTE — PROGRESS NOTES
Pt reports that tension and hoarseness continue. He states that his doctor has added Busbar to the Lexapro he is currently taking. He continues to experience periods of hoarseness to not being able to talk at all during his word time when he is on calls. He states that when the customer is talking, he will put his jazmin on mute and practice his response several times then when he needs to talk with them, he is unable to talk. He was encouraged to continue the exercises as well as the breathing strategies during the pauses rather than practicing as it appears that it creates increased stress and anxiety. He also stated that he has been exploring a second job to make up the income he looses when he needs to take time from work because of his voice. He is currently arranging to work for [employer]. Continue plan of care.        [address], [address] [facility] 3682  2/28/2023

## 2023-02-28 NOTE — TELEPHONE ENCOUNTER
Refills have been requested for the following medications:         lisinopril (PRINIVIL;ZESTRIL) 40 MG tablet [Dr. Carrington King MD]      Patient Comment: Can you please send to [facility]  on [facility]     Preferred pharmacy: [address] Gibran Norris [address] [facility] 178 [address]

## 2023-03-01 RX ORDER — LISINOPRIL 40 MG/1
40 TABLET ORAL DAILY
Qty: 90 TABLET | Refills: 3 | OUTPATIENT
Start: 2023-03-01

## 2023-03-01 NOTE — TELEPHONE ENCOUNTER
Last Appointment:  10/5/2022  Future Appointments   Date Time Provider Andre Mccann   3/14/2023 11:00 AM Teri Joseph, CHANA [address]   3/21/2023 11:00 AM CHANA Toure [facility]. [place]   3/28/2023 11:00 AM CHANA Toure [address]   4/4/2023 11:00 AM Ngozi Toure [address] [facility]

## 2023-03-01 NOTE — TELEPHONE ENCOUNTER
Last Appointment:  10/5/2022  Future Appointments   Date Time Provider Andre Mccann   3/14/2023 11:00 AM CHANA Sanchez [address]   3/21/2023 11:00 AM CHANA Sanchez [facility]. [place]   3/28/2023 11:00 AM CHANA Sanchezi [address]   4/4/2023 11:00 AM Ngozi Sanchez [address] [facility]

## 2023-03-10 ENCOUNTER — TELEPHONE (OUTPATIENT)
Dept: FAMILY MEDICINE CLINIC | Age: 53
End: 2023-03-10

## 2023-03-10 NOTE — TELEPHONE ENCOUNTER
Unable to reach patient. LVM advising patient to bring in [place] paperwork and give them to [address] [facility].

## 2023-03-12 ENCOUNTER — PATIENT MESSAGE (OUTPATIENT)
Dept: FAMILY MEDICINE CLINIC | Age: 53
End: 2023-03-12

## 2023-03-12 DIAGNOSIS — J38.3 VOCAL CORD DYSFUNCTION: Primary | ICD-10-CM

## 2023-03-12 DIAGNOSIS — R49.0 MUSCLE TENSION DYSPHONIA: ICD-10-CM

## 2023-03-13 RX ORDER — METHOCARBAMOL 500 MG/1
500 TABLET, FILM COATED ORAL 4 TIMES DAILY
Qty: 40 TABLET | Refills: 0 | Status: SHIPPED | OUTPATIENT
Start: 2023-03-13 | End: 2023-03-23

## 2023-03-13 NOTE — TELEPHONE ENCOUNTER
From: Nadia Smalls  To: Dr. Sasha Bullard  Sent: 3/12/2023 10:34 PM EDT  Subject: Anxiety    I wanted to run something by you. In addition to long term anxiety medication change (dosage or medication) I had an idea. I don't know if this is a stupid idea, or pointless. ..but this has to get fixed. I want my life back and I'm desperate. My throat is hoarse most of the time now, and when I work, my larynx get squeezed to the point that I can't get sound to come out. ..so it's obviously muscle tension. ..caused by something, and made worse by anxiety. I was wondering what would happen if I took a straight up muscle relaxer to see if it relaxes my throat muscles too. Obviously this isn't a long term solution if it works, but it might be another clue. ..and interesting to try. What do you think? Also, I haven't received a call from the gastroenterologist yet. Do you know how long they usually take to call?

## 2023-03-14 ENCOUNTER — HOSPITAL ENCOUNTER (OUTPATIENT)
Dept: SPEECH THERAPY | Age: 53
Setting detail: THERAPIES SERIES
Discharge: HOME | End: 2023-03-14

## 2023-03-14 PROCEDURE — 92507 TX SP LANG VOICE COMM INDIV: CPT

## 2023-03-14 NOTE — PROGRESS NOTES
Pt reports that he realized that his hoarseness is not caused by acid reflux and has since discontinued the medication. He knows that it is muscle tension  and stress that is causing the hoarseness. He contacted the doctor who has prescribed a muscle relaxer (Robaxin). Diaphragmatic breathing, inhaling/exhaling, easy onset and stretching were discussed and practiced/modeled with good success. Continue plan of care.        [address], [address] [address]37  3/14/2023

## 2023-03-24 ENCOUNTER — PATIENT MESSAGE (OUTPATIENT)
Dept: FAMILY MEDICINE CLINIC | Age: 53
End: 2023-03-24

## 2023-03-24 DIAGNOSIS — J38.3 VOCAL CORD DYSFUNCTION: ICD-10-CM

## 2023-03-24 DIAGNOSIS — R49.0 MUSCLE TENSION DYSPHONIA: Primary | ICD-10-CM

## 2023-03-24 DIAGNOSIS — F41.9 ANXIETY: ICD-10-CM

## 2023-03-27 NOTE — TELEPHONE ENCOUNTER
From: Ritesh Minor  To: Dr. Katalina Bishop  Sent: 3/24/2023 7:54 PM EDT  Subject: Video    I just watched a YouTube video where he talks about spasmodic dysphonia. When he talks, it sounds like what my voice does. When I'm at work, it's way worse than his. Sounds almost doesn't come out at the beginning of the call, then I relax a little and it gets a little better. Now. ..here's a couple head scratchers for you. Spasmodic dysphonia seems to be a vocal cord issue. When Dr. Xu Damico scoped me, he said the folds were vibrating normally and there were no nodules or growths. Here's the cherry on top of the head scratching cake. ..when my voice is like that and my wife is having trouble understanding me, I can use falsetto, like a Tirso Mouse voice and it clears up. Ok, falsetto may use a different part of the vocal folds. Check this out, when I sing it's clear. It's almost like I'm flying above the turbulence. I can do a [place] accent and it's almost always perfect clear. (I don't do drugs of any kind, I swear) Oh, and when I'm in a call at work, none of this   stuff works. Its a whole different ball game at work. I've given up trying to [employer]. Lawernce Roughen Lawernce Roughen I've read everything and nothing applies to my situation. But yeah, I'm ready to try whatever you want to prescribe. I don't really feel like Lexapro is working. The first month or so felt promising, but not anymore. I also believe this can be fixed. The fix for this FEELS really close, but I can't see it yet. Let's keep chipping away.

## 2023-03-28 ENCOUNTER — HOSPITAL ENCOUNTER (OUTPATIENT)
Dept: SPEECH THERAPY | Age: 53
Setting detail: THERAPIES SERIES
Discharge: HOME | End: 2023-03-28

## 2023-03-28 RX ORDER — DIAZEPAM 2 MG/1
2 TABLET ORAL EVERY 6 HOURS PRN
Qty: 30 TABLET | Refills: 0 | Status: SHIPPED | OUTPATIENT
Start: 2023-03-28 | End: 2023-04-07

## 2023-03-28 NOTE — PROGRESS NOTES
Speech Therapy  Cancellation/No-show Note  Patient Name:  Trevor Nissen  :  1970   Date:  3/28/2023    For today's appointment patient:  [x]  Cancelled  []  Rescheduled appointment  []  No-show     Reason given by patient:  []  Patient ill  []  Conflicting appointment  []  No transportation    []  Conflict with work  []  No reason given  [x]  Other:  Left message with scheduling  but did not give a reason. Comments:      Electronically signed by:   Bethany Blue, SLP

## 2023-04-04 ENCOUNTER — HOSPITAL ENCOUNTER (OUTPATIENT)
Dept: SPEECH THERAPY | Age: 53
Setting detail: THERAPIES SERIES
Discharge: HOME | End: 2023-04-04

## 2023-04-04 NOTE — PROGRESS NOTES
Speech Therapy  Cancellation/No-show Note  Patient Name:  Karis Carlos  :  1970   Date:  2023    For today's appointment patient:  [x]  Cancelled  []  Rescheduled appointment  []  No-show     Reason given by patient:  [x]  Patient ill  []  Conflicting appointment  []  No transportation    []  Conflict with work  []  No reason given  []  Other:     Comments:      Electronically signed by:   Sterling Seip, SLP

## 2023-04-14 PROBLEM — G24.9 DYSTONIA: Status: ACTIVE | Noted: 2023-04-14

## 2023-04-18 ENCOUNTER — HOSPITAL ENCOUNTER (OUTPATIENT)
Dept: SPEECH THERAPY | Age: 53
Setting detail: THERAPIES SERIES
Discharge: HOME | End: 2023-04-18
Payer: COMMERCIAL

## 2023-04-18 NOTE — PROGRESS NOTES
Speech Therapy  Cancellation/No-show Note  Patient Name:  Wang Bundy  :  1970   Date:  2023    For today's appointment patient:  [x]  Cancelled  []  Rescheduled appointment  []  No-show     Reason given by patient:  [x]  Patient ill  []  Conflicting appointment  []  No transportation    []  Conflict with work  []  No reason given  []  Other:     Comments:      Electronically signed by:   Alan Zaidi SLP

## 2023-04-25 ENCOUNTER — HOSPITAL ENCOUNTER (OUTPATIENT)
Dept: SPEECH THERAPY | Age: 53
Setting detail: THERAPIES SERIES
Discharge: HOME | End: 2023-04-25

## 2023-04-25 PROCEDURE — 92507 TX SP LANG VOICE COMM INDIV: CPT

## 2023-04-25 NOTE — PROGRESS NOTES
Vocal function exercises targeting tones resonating in the front of the face rather than the back of the throat were continued with fair increasing to fair+ success. A handout was provided for home practice. Breathing strategies, easy onset and stretching were revisited. Shanique Moya was encouraged to continue practicing these strategies so that when he needs to speak, he can use the strategies to reduce the tension causing the strained/hoarse voice. He verbalized understanding. He also stated that he has been referred to the [facility] to see  an ear nose throat doctor that specializes in speech conditions. Continue plan of care.        [address], [address] [facility] 9069  4/25/2023

## 2023-04-26 ENCOUNTER — TELEPHONE (OUTPATIENT)
Dept: SURGERY | Age: 53
End: 2023-04-26

## 2023-04-26 NOTE — TELEPHONE ENCOUNTER
Per pt request, MA called surgery scheduling and cx pt procedure with Dr. Johnson Pedersen 5/2/23. Routing to [employer] for informational purposes.     Electronically signed by Raul Magdaleno MA on 4/26/2023 at 8:55 AM

## 2023-05-02 ENCOUNTER — HOSPITAL ENCOUNTER (OUTPATIENT)
Dept: SPEECH THERAPY | Age: 53
Setting detail: THERAPIES SERIES
Discharge: HOME | End: 2023-05-02

## 2023-05-02 NOTE — PROGRESS NOTES
Speech Pathology  Discharge Report      Name: Lor Messer  : 1970  Date of Report:  2023       GOALS:     Key:  NC = No change;  IMP = Improving; [facility] = Achieved     Patient will engage in Resonant voice therapy tasks with adequate carryover in conversational    IMP        speech as evidence by SLP observations and patient report with 90% accuracy. 2.  Patient will independently demonstrate/complete shoulder and cervical exercises in order to stretch   IMP       the extrinsic laryngeal muscles and promote relaxed laryngeal movement. 3. Patient will independently utilize the technique of diaphragmatic breathing as the basis for easy voice   IMP       production. 4. Patient will independently use easy onset/yawn sigh techniques to maintain production of voice without   IMP      hard glottal attacks and in order to reduce tension on the vocal folds. 5. Patient will demonstrate good return demonstration of all exercises presented for independent home practice. 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 He states that he begins calls and his voice is relatively normal and begins to feel the tension build to where he almost looses his voice by the end of the call then as the call is finishing his voice returns to near normal.  Breathing strategies including inhaling/exhaling through the nose to the count of 2 or 3 and increasing to 4 and diaphragmatic breathing were reviewed, modeled and practiced. Laryngeal massage was introduced and will continue at the next scheduled session    Laryngeal massage was reviewed.   Pt states that he attempted the massage but had some difficulty with it

## 2023-05-02 NOTE — PROGRESS NOTES
Speech Therapy  Cancellation/No-show Note  Patient Name:  Elvis Holguin  :  1970   Date:  2023    For today's appointment patient:  [x]  Cancelled  []  Rescheduled appointment  []  No-show     Reason given by patient:  []  Patient ill  []  Conflicting appointment  []  No transportation    []  Conflict with work  []  No reason given  [x]  Other:  Patient had appt at [facility] yesterday. Symptoms and data findings are consistent with the diagnosis of adductor spasmodic dysphonia. Recommended a trial of Botox injections for symptom improvement. Pt would like to discontinue this time                    and begin with the Botox injections. Comments:      Electronically signed by:   Milagro Kidd, SLP

## 2023-05-22 ENCOUNTER — PATIENT MESSAGE (OUTPATIENT)
Dept: FAMILY MEDICINE CLINIC | Age: 53
End: 2023-05-22

## 2023-05-22 DIAGNOSIS — F41.9 ANXIETY: ICD-10-CM

## 2023-05-22 DIAGNOSIS — R49.0 MUSCLE TENSION DYSPHONIA: ICD-10-CM

## 2023-05-22 DIAGNOSIS — J38.3 VOCAL CORD DYSFUNCTION: ICD-10-CM

## 2023-05-23 NOTE — TELEPHONE ENCOUNTER
From: Taylor Philip  To: Dr. Cisneros Mention  Sent: 5/22/2023 2:11 PM EDT  Subject: Diazepam     Can I continue taking the diazepam? You said there was something I would need to sign or fill out? Also, would you be able to to help with more jardiance samples? We've been preparing to move for the last couple months, so she's been stress eating. She's getting better with her eating now, but if you could help with the samples, we'd really appreciate it.

## 2023-05-26 RX ORDER — DIAZEPAM 2 MG/1
2 TABLET ORAL 2 TIMES DAILY PRN
Qty: 60 TABLET | Refills: 0 | Status: SHIPPED | OUTPATIENT
Start: 2023-05-26 | End: 2023-06-25

## 2023-07-10 ENCOUNTER — OFFICE VISIT (OUTPATIENT)
Dept: FAMILY MEDICINE CLINIC | Age: 53
End: 2023-07-10

## 2023-07-10 ENCOUNTER — HOSPITAL ENCOUNTER (OUTPATIENT)
Age: 53
Discharge: HOME | End: 2023-07-10

## 2023-07-10 VITALS
OXYGEN SATURATION: 96 % | RESPIRATION RATE: 20 BRPM | WEIGHT: 277 LBS | BODY MASS INDEX: 43.47 KG/M2 | SYSTOLIC BLOOD PRESSURE: 137 MMHG | HEART RATE: 88 BPM | HEIGHT: 67 IN | DIASTOLIC BLOOD PRESSURE: 89 MMHG | TEMPERATURE: 97.3 F

## 2023-07-10 DIAGNOSIS — I10 ESSENTIAL HYPERTENSION: ICD-10-CM

## 2023-07-10 DIAGNOSIS — Z12.5 SCREENING FOR PROSTATE CANCER: ICD-10-CM

## 2023-07-10 DIAGNOSIS — M25.562 ACUTE PAIN OF LEFT KNEE: Primary | ICD-10-CM

## 2023-07-10 DIAGNOSIS — E03.9 ACQUIRED HYPOTHYROIDISM: ICD-10-CM

## 2023-07-10 DIAGNOSIS — E66.01 MORBID OBESITY WITH BMI OF 40.0-44.9, ADULT (HCC): ICD-10-CM

## 2023-07-10 DIAGNOSIS — Z13.220 SCREENING FOR CHOLESTEROL LEVEL: ICD-10-CM

## 2023-07-10 DIAGNOSIS — M13.0 POLYARTHRITIS: ICD-10-CM

## 2023-07-10 PROBLEM — J38.5 LARYNGEAL SPASM: Status: ACTIVE | Noted: 2023-05-01

## 2023-07-10 PROBLEM — J38.3 ADDUCTOR SPASMODIC DYSPHONIA: Status: ACTIVE | Noted: 2023-05-01

## 2023-07-10 LAB
ALBUMIN SERPL-MCNC: 4.4 G/DL (ref 3.5–5.2)
ALP SERPL-CCNC: 107 U/L (ref 40–129)
ALT SERPL-CCNC: 27 U/L (ref 0–40)
ANION GAP SERPL CALCULATED.3IONS-SCNC: 12 MMOL/L (ref 7–16)
AST SERPL-CCNC: 23 U/L (ref 0–39)
BASOPHILS # BLD: 0.12 E9/L (ref 0–0.2)
BASOPHILS NFR BLD: 1.2 % (ref 0–2)
BILIRUB SERPL-MCNC: 0.4 MG/DL (ref 0–1.2)
BUN SERPL-MCNC: 16 MG/DL (ref 6–20)
CALCIUM SERPL-MCNC: 9.4 MG/DL (ref 8.6–10.2)
CHLORIDE SERPL-SCNC: 102 MMOL/L (ref 98–107)
CHOLESTEROL, TOTAL: 188 MG/DL (ref 0–199)
CO2 SERPL-SCNC: 28 MMOL/L (ref 22–29)
CREAT SERPL-MCNC: 0.8 MG/DL (ref 0.7–1.2)
CRP SERPL HS-MCNC: 1.2 MG/DL (ref 0–0.4)
EOSINOPHIL # BLD: 0.38 E9/L (ref 0.05–0.5)
EOSINOPHIL NFR BLD: 3.8 % (ref 0–6)
ERYTHROCYTE [DISTWIDTH] IN BLOOD BY AUTOMATED COUNT: 13.6 FL (ref 11.5–15)
ERYTHROCYTE [SEDIMENTATION RATE] IN BLOOD BY WESTERGREN METHOD: 10 MM/HR (ref 0–15)
GLUCOSE SERPL-MCNC: 102 MG/DL (ref 74–99)
HCT VFR BLD AUTO: 44.2 % (ref 37–54)
HDLC SERPL-MCNC: 45 MG/DL
HGB BLD-MCNC: 15.5 G/DL (ref 12.5–16.5)
IMM GRANULOCYTES # BLD: 0.04 E9/L
IMM GRANULOCYTES NFR BLD: 0.4 % (ref 0–5)
LDLC SERPL CALC-MCNC: 100 MG/DL (ref 0–99)
LYMPHOCYTES # BLD: 2.57 E9/L (ref 1.5–4)
LYMPHOCYTES NFR BLD: 25.9 % (ref 20–42)
MCH RBC QN AUTO: 28 PG (ref 26–35)
MCHC RBC AUTO-ENTMCNC: 35.1 % (ref 32–34.5)
MCV RBC AUTO: 79.8 FL (ref 80–99.9)
MONOCYTES # BLD: 0.66 E9/L (ref 0.1–0.95)
MONOCYTES NFR BLD: 6.7 % (ref 2–12)
NEUTROPHILS # BLD: 6.14 E9/L (ref 1.8–7.3)
NEUTS SEG NFR BLD: 62 % (ref 43–80)
PLATELET # BLD AUTO: 330 E9/L (ref 130–450)
PMV BLD AUTO: 8.8 FL (ref 7–12)
POTASSIUM SERPL-SCNC: 3.2 MMOL/L (ref 3.5–5)
PROT SERPL-MCNC: 7.8 G/DL (ref 6.4–8.3)
PSA SERPL-MCNC: 0.42 NG/ML (ref 0–4)
RBC # BLD AUTO: 5.54 E12/L (ref 3.8–5.8)
RHEUMATOID FACT SER NEPH-ACNC: <10 IU/ML (ref 0–13)
SODIUM SERPL-SCNC: 142 MMOL/L (ref 132–146)
TRIGL SERPL-MCNC: 215 MG/DL (ref 0–149)
TSH SERPL-MCNC: 6.18 UIU/ML (ref 0.27–4.2)
VLDLC SERPL CALC-MCNC: 43 MG/DL
WBC # BLD: 9.9 E9/L (ref 4.5–11.5)

## 2023-07-10 PROCEDURE — 80053 COMPREHEN METABOLIC PANEL: CPT

## 2023-07-10 PROCEDURE — 86200 CCP ANTIBODY: CPT

## 2023-07-10 PROCEDURE — 84443 ASSAY THYROID STIM HORMONE: CPT

## 2023-07-10 PROCEDURE — 85651 RBC SED RATE NONAUTOMATED: CPT

## 2023-07-10 PROCEDURE — 86431 RHEUMATOID FACTOR QUANT: CPT

## 2023-07-10 PROCEDURE — G0103 PSA SCREENING: HCPCS

## 2023-07-10 PROCEDURE — 3079F DIAST BP 80-89 MM HG: CPT | Performed by: STUDENT IN AN ORGANIZED HEALTH CARE EDUCATION/TRAINING PROGRAM

## 2023-07-10 PROCEDURE — 3075F SYST BP GE 130 - 139MM HG: CPT | Performed by: STUDENT IN AN ORGANIZED HEALTH CARE EDUCATION/TRAINING PROGRAM

## 2023-07-10 PROCEDURE — 86140 C-REACTIVE PROTEIN: CPT

## 2023-07-10 PROCEDURE — 80061 LIPID PANEL: CPT

## 2023-07-10 PROCEDURE — 85025 COMPLETE CBC W/AUTO DIFF WBC: CPT

## 2023-07-10 PROCEDURE — 99214 OFFICE O/P EST MOD 30 MIN: CPT | Performed by: STUDENT IN AN ORGANIZED HEALTH CARE EDUCATION/TRAINING PROGRAM

## 2023-07-10 PROCEDURE — 86038 ANTINUCLEAR ANTIBODIES: CPT

## 2023-07-10 PROCEDURE — 36415 COLL VENOUS BLD VENIPUNCTURE: CPT

## 2023-07-10 RX ORDER — PREDNISONE 20 MG/1
40 TABLET ORAL DAILY
Qty: 10 TABLET | Refills: 0 | Status: SHIPPED | OUTPATIENT
Start: 2023-07-10 | End: 2023-07-15

## 2023-07-10 SDOH — ECONOMIC STABILITY: INCOME INSECURITY: HOW HARD IS IT FOR YOU TO PAY FOR THE VERY BASICS LIKE FOOD, HOUSING, MEDICAL CARE, AND HEATING?: NOT HARD AT ALL

## 2023-07-10 SDOH — ECONOMIC STABILITY: FOOD INSECURITY: WITHIN THE PAST 12 MONTHS, THE FOOD YOU BOUGHT JUST DIDN'T LAST AND YOU DIDN'T HAVE MONEY TO GET MORE.: NEVER TRUE

## 2023-07-10 SDOH — ECONOMIC STABILITY: HOUSING INSECURITY
IN THE LAST 12 MONTHS, WAS THERE A TIME WHEN YOU DID NOT HAVE A STEADY PLACE TO SLEEP OR SLEPT IN A SHELTER (INCLUDING NOW)?: NO

## 2023-07-10 SDOH — HEALTH STABILITY: PHYSICAL HEALTH: ON AVERAGE, HOW MANY DAYS PER WEEK DO YOU ENGAGE IN MODERATE TO STRENUOUS EXERCISE (LIKE A BRISK WALK)?: 0 DAYS

## 2023-07-10 SDOH — ECONOMIC STABILITY: FOOD INSECURITY: WITHIN THE PAST 12 MONTHS, YOU WORRIED THAT YOUR FOOD WOULD RUN OUT BEFORE YOU GOT MONEY TO BUY MORE.: NEVER TRUE

## 2023-07-10 SDOH — HEALTH STABILITY: PHYSICAL HEALTH: ON AVERAGE, HOW MANY MINUTES DO YOU ENGAGE IN EXERCISE AT THIS LEVEL?: 0 MIN

## 2023-07-10 ASSESSMENT — ENCOUNTER SYMPTOMS
SHORTNESS OF BREATH: 0
TROUBLE SWALLOWING: 0
ABDOMINAL PAIN: 0

## 2023-07-10 ASSESSMENT — PATIENT HEALTH QUESTIONNAIRE - PHQ9
SUM OF ALL RESPONSES TO PHQ9 QUESTIONS 1 & 2: 0
SUM OF ALL RESPONSES TO PHQ QUESTIONS 1-9: 0
SUM OF ALL RESPONSES TO PHQ QUESTIONS 1-9: 0
2. FEELING DOWN, DEPRESSED OR HOPELESS: 0
SUM OF ALL RESPONSES TO PHQ QUESTIONS 1-9: 0
SUM OF ALL RESPONSES TO PHQ QUESTIONS 1-9: 0
1. LITTLE INTEREST OR PLEASURE IN DOING THINGS: 0

## 2023-07-10 ASSESSMENT — SOCIAL DETERMINANTS OF HEALTH (SDOH)
WITHIN THE LAST YEAR, HAVE TO BEEN RAPED OR FORCED TO HAVE ANY KIND OF SEXUAL ACTIVITY BY YOUR PARTNER OR EX-PARTNER?: NO
DO YOU BELONG TO ANY CLUBS OR ORGANIZATIONS SUCH AS CHURCH GROUPS UNIONS, FRATERNAL OR ATHLETIC GROUPS, OR SCHOOL GROUPS?: NO
HOW OFTEN DO YOU ATTENT MEETINGS OF THE CLUB OR ORGANIZATION YOU BELONG TO?: NEVER
HOW OFTEN DO YOU GET TOGETHER WITH FRIENDS OR RELATIVES?: ONCE A WEEK
WITHIN THE LAST YEAR, HAVE YOU BEEN AFRAID OF YOUR PARTNER OR EX-PARTNER?: NO
WITHIN THE LAST YEAR, HAVE YOU BEEN HUMILIATED OR EMOTIONALLY ABUSED IN OTHER WAYS BY YOUR PARTNER OR EX-PARTNER?: NO
HOW OFTEN DO YOU ATTEND CHURCH OR RELIGIOUS SERVICES?: NEVER
IN A TYPICAL WEEK, HOW MANY TIMES DO YOU TALK ON THE PHONE WITH FAMILY, FRIENDS, OR NEIGHBORS?: MORE THAN THREE TIMES A WEEK
WITHIN THE LAST YEAR, HAVE YOU BEEN KICKED, HIT, SLAPPED, OR OTHERWISE PHYSICALLY HURT BY YOUR PARTNER OR EX-PARTNER?: NO

## 2023-07-10 ASSESSMENT — LIFESTYLE VARIABLES
HOW OFTEN DO YOU HAVE A DRINK CONTAINING ALCOHOL: NEVER
HOW MANY STANDARD DRINKS CONTAINING ALCOHOL DO YOU HAVE ON A TYPICAL DAY: PATIENT DOES NOT DRINK

## 2023-07-11 LAB — ANA SER QL: NEGATIVE

## 2023-07-14 LAB — CCP AB SER IA-ACNC: 1.3 U/ML (ref 0–7)

## 2023-07-16 DIAGNOSIS — E03.9 ACQUIRED HYPOTHYROIDISM: Primary | ICD-10-CM

## 2023-08-15 ENCOUNTER — PATIENT MESSAGE (OUTPATIENT)
Dept: FAMILY MEDICINE CLINIC | Age: 53
End: 2023-08-15

## 2023-08-15 DIAGNOSIS — R49.0 MUSCLE TENSION DYSPHONIA: ICD-10-CM

## 2023-08-15 DIAGNOSIS — J38.3 VOCAL CORD DYSFUNCTION: ICD-10-CM

## 2023-08-15 DIAGNOSIS — F41.9 ANXIETY: ICD-10-CM

## 2023-08-15 NOTE — TELEPHONE ENCOUNTER
From: Ruth Vaca  To: Dr. Deleon Signs  Sent: 8/15/2023 1:16 AM EDT  Subject: Valium    I changed my mind about the Valium refill. Would I be able to get a refill for as needed use?

## 2023-08-16 RX ORDER — DIAZEPAM 2 MG/1
2 TABLET ORAL 2 TIMES DAILY PRN
Qty: 60 TABLET | Refills: 0 | Status: SHIPPED | OUTPATIENT
Start: 2023-08-16 | End: 2023-09-15

## 2023-08-16 NOTE — TELEPHONE ENCOUNTER
1. Muscle tension dysphonia  -     diazePAM (VALIUM) 2 MG tablet; Take 1 tablet by mouth 2 times daily as needed for Anxiety for up to 30 days. Max Daily Amount: 4 mg, Disp-60 tablet, R-0Normal  2. Vocal cord dysfunction  -     diazePAM (VALIUM) 2 MG tablet; Take 1 tablet by mouth 2 times daily as needed for Anxiety for up to 30 days. Max Daily Amount: 4 mg, Disp-60 tablet, R-0Normal  3. Anxiety  -     diazePAM (VALIUM) 2 MG tablet; Take 1 tablet by mouth 2 times daily as needed for Anxiety for up to 30 days.  Max Daily Amount: 4 mg, Disp-60 tablet, R-0Normal    Okay for as needed use    PDMP appropriate

## 2023-09-18 RX ORDER — LISINOPRIL 40 MG/1
40 TABLET ORAL DAILY
Qty: 90 TABLET | Refills: 3 | Status: SHIPPED | OUTPATIENT
Start: 2023-09-18

## 2023-10-25 ENCOUNTER — PATIENT MESSAGE (OUTPATIENT)
Dept: FAMILY MEDICINE CLINIC | Age: 53
End: 2023-10-25

## 2023-10-25 DIAGNOSIS — L03.119 CELLULITIS OF LOWER EXTREMITY, UNSPECIFIED LATERALITY: Primary | ICD-10-CM

## 2023-10-26 RX ORDER — CEPHALEXIN 500 MG/1
500 CAPSULE ORAL 2 TIMES DAILY
Qty: 14 CAPSULE | Refills: 0 | Status: SHIPPED | OUTPATIENT
Start: 2023-10-26 | End: 2023-11-02

## 2023-10-26 RX ORDER — CEPHALEXIN 500 MG/1
500 CAPSULE ORAL 2 TIMES DAILY
Qty: 14 CAPSULE | Refills: 0 | Status: SHIPPED
Start: 2023-10-26 | End: 2023-10-26

## 2023-10-31 ENCOUNTER — OFFICE VISIT (OUTPATIENT)
Dept: FAMILY MEDICINE CLINIC | Age: 53
End: 2023-10-31

## 2023-10-31 VITALS
HEIGHT: 67 IN | RESPIRATION RATE: 20 BRPM | BODY MASS INDEX: 45.2 KG/M2 | DIASTOLIC BLOOD PRESSURE: 96 MMHG | SYSTOLIC BLOOD PRESSURE: 161 MMHG | HEART RATE: 103 BPM | TEMPERATURE: 96.9 F | WEIGHT: 288 LBS | OXYGEN SATURATION: 97 %

## 2023-10-31 DIAGNOSIS — E66.01 CLASS 3 SEVERE OBESITY DUE TO EXCESS CALORIES WITH SERIOUS COMORBIDITY AND BODY MASS INDEX (BMI) OF 45.0 TO 49.9 IN ADULT (HCC): ICD-10-CM

## 2023-10-31 DIAGNOSIS — E03.9 ACQUIRED HYPOTHYROIDISM: ICD-10-CM

## 2023-10-31 DIAGNOSIS — R63.5 WEIGHT GAIN: ICD-10-CM

## 2023-10-31 DIAGNOSIS — L21.9 SEBORRHEIC DERMATITIS: Primary | ICD-10-CM

## 2023-10-31 DIAGNOSIS — R71.8 LOW MEAN CORPUSCULAR VOLUME (MCV): ICD-10-CM

## 2023-10-31 DIAGNOSIS — R73.9 HYPERGLYCEMIA: ICD-10-CM

## 2023-10-31 DIAGNOSIS — I10 ESSENTIAL HYPERTENSION: ICD-10-CM

## 2023-10-31 DIAGNOSIS — D48.9 NEOPLASM OF UNCERTAIN BEHAVIOR: ICD-10-CM

## 2023-10-31 DIAGNOSIS — L57.0 ACTINIC KERATOSIS: ICD-10-CM

## 2023-10-31 LAB
ABSOLUTE IMMATURE GRANULOCYTE: 0.06 K/UL (ref 0–0.58)
BASOPHILS ABSOLUTE: 0.09 K/UL (ref 0–0.2)
BASOPHILS RELATIVE PERCENT: 1 % (ref 0–2)
EOSINOPHILS ABSOLUTE: 0.3 K/UL (ref 0.05–0.5)
EOSINOPHILS RELATIVE PERCENT: 3 % (ref 0–6)
FOLATE: >20 NG/ML (ref 4.8–24.2)
HBA1C MFR BLD: 6 % (ref 4–5.6)
HCT VFR BLD CALC: 44.3 % (ref 37–54)
HEMOGLOBIN: 14.9 G/DL (ref 12.5–16.5)
IMMATURE GRANULOCYTES: 1 % (ref 0–5)
LYMPHOCYTES ABSOLUTE: 1.64 K/UL (ref 1.5–4)
LYMPHOCYTES RELATIVE PERCENT: 17 % (ref 20–42)
MCH RBC QN AUTO: 27.3 PG (ref 26–35)
MCHC RBC AUTO-ENTMCNC: 33.6 G/DL (ref 32–34.5)
MCV RBC AUTO: 81.1 FL (ref 80–99.9)
MONOCYTES ABSOLUTE: 0.39 K/UL (ref 0.1–0.95)
MONOCYTES RELATIVE PERCENT: 4 % (ref 2–12)
NEUTROPHILS ABSOLUTE: 6.92 K/UL (ref 1.8–7.3)
NEUTROPHILS RELATIVE PERCENT: 74 % (ref 43–80)
PDW BLD-RTO: 13.7 % (ref 11.5–15)
PLATELET # BLD: 338 K/UL (ref 130–450)
PMV BLD AUTO: 9.9 FL (ref 7–12)
RBC # BLD: 5.46 M/UL (ref 3.8–5.8)
T3 TOTAL: 142 NG/DL (ref 80–200)
T4 FREE: 1.3 NG/DL (ref 0.9–1.7)
TSH SERPL DL<=0.05 MIU/L-ACNC: 2.34 UIU/ML (ref 0.27–4.2)
VITAMIN B-12: 829 PG/ML (ref 211–946)
WBC # BLD: 9.4 K/UL (ref 4.5–11.5)

## 2023-10-31 PROCEDURE — 3080F DIAST BP >= 90 MM HG: CPT | Performed by: STUDENT IN AN ORGANIZED HEALTH CARE EDUCATION/TRAINING PROGRAM

## 2023-10-31 PROCEDURE — 36415 COLL VENOUS BLD VENIPUNCTURE: CPT | Performed by: STUDENT IN AN ORGANIZED HEALTH CARE EDUCATION/TRAINING PROGRAM

## 2023-10-31 PROCEDURE — 3077F SYST BP >= 140 MM HG: CPT | Performed by: STUDENT IN AN ORGANIZED HEALTH CARE EDUCATION/TRAINING PROGRAM

## 2023-10-31 PROCEDURE — 99214 OFFICE O/P EST MOD 30 MIN: CPT | Performed by: STUDENT IN AN ORGANIZED HEALTH CARE EDUCATION/TRAINING PROGRAM

## 2023-10-31 RX ORDER — KETOCONAZOLE 20 MG/G
CREAM TOPICAL
Qty: 30 G | Refills: 1 | Status: SHIPPED | OUTPATIENT
Start: 2023-10-31

## 2023-10-31 ASSESSMENT — ENCOUNTER SYMPTOMS
ABDOMINAL PAIN: 0
SHORTNESS OF BREATH: 0
TROUBLE SWALLOWING: 0

## 2023-10-31 NOTE — PATIENT INSTRUCTIONS
that came with your blood pressure monitor. They might be different from the following. Press the on/off button on the automatic monitor. Then you may need to wait until the screen says the monitor is ready. Press the start button. The cuff will inflate and deflate by itself. Your blood pressure numbers will appear on the screen. Wait one minute and take your blood pressure again. If your monitor does not automatically save your numbers, write them in your log book, along with the date and time.

## 2023-10-31 NOTE — PROGRESS NOTES
Dermatology  Oliva Schaefer (:  1970) is a 47 y.o. 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 Seborrheic dermatitis  Chronic, risk, will try ketoconazole cream  -     ketoconazole (NIZORAL) 2 % cream; Apply topically daily. , Disp-30 g, R-1, Normal  2. Actinic keratosis  Lesion below his eye does look to possibly be actinic keratosis, referral to dermatology  -     Reema Perez MD,  Dermatology, [facility] ([facility])  3. Neoplasm of uncertain behavior  Subacute, 5 months, fibrous papule versus skin cancer, recommended t dermatology, provided him with number to call    -     Reema Perez MD,  Dermatology, [facility] ([facility])  4. Low mean corpuscular volume (MCV)  Chronic, unclear, possibly iron deficiency recommended increase iron-containing foods  -     CBC with Auto Differential; Future  -     Vitamin B12 & Folate; Future  5. Hyperglycemia  Wife has diabetes, checks her sugars at home, 180 after eating emergency  -     Hemoglobin A1C; Future  6. Essential hypertension  Chronic, at goal at home, elevated  (It is due to stress and anxiety of being here, he will continue to check at home we need to increase medications if it remains elevated, instructions given on how to check blood pressure and goal and after visit summary  7. Acquired hypothyroidism  Chronic, continue significant weight gain and fatigue, will order repeat of thyroid testing along with thyroid antibody  -     TSH; Future  -     T4, Free; Future  -     Thyroid Peroxidase Antibody; Future  -     T3; Future  8.  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

## 2023-11-05 LAB — THYROID PEROXIDASE (TPO) AB: <4 IU/ML (ref 0–25)

## 2023-12-08 ENCOUNTER — PATIENT MESSAGE (OUTPATIENT)
Dept: FAMILY MEDICINE CLINIC | Age: 53
End: 2023-12-08

## 2023-12-08 DIAGNOSIS — F41.9 ANXIETY: ICD-10-CM

## 2023-12-08 DIAGNOSIS — J38.3 VOCAL CORD DYSFUNCTION: ICD-10-CM

## 2023-12-08 DIAGNOSIS — R49.0 MUSCLE TENSION DYSPHONIA: ICD-10-CM

## 2023-12-08 NOTE — TELEPHONE ENCOUNTER
From: Catherine Alberts  To: Dr. Dragan Jimenez  Sent: 12/8/2023 12:19 AM EST  Subject: Diazepam     Can you please send a refill of diazepam? I only take one once in a while, but it helps.

## 2023-12-10 RX ORDER — DIAZEPAM 2 MG/1
2 TABLET ORAL 2 TIMES DAILY PRN
Qty: 60 TABLET | Refills: 0 | Status: SHIPPED | OUTPATIENT
Start: 2023-12-10 | End: 2024-01-09

## 2024-03-04 ENCOUNTER — COMMUNITY OUTREACH (OUTPATIENT)
Dept: FAMILY MEDICINE CLINIC | Age: 54
End: 2024-03-04

## 2024-03-20 DIAGNOSIS — F41.9 ANXIETY: ICD-10-CM

## 2024-03-20 RX ORDER — ESCITALOPRAM OXALATE 10 MG/1
10 TABLET ORAL DAILY
Qty: 90 TABLET | Refills: 1 | Status: SHIPPED | OUTPATIENT
Start: 2024-03-20

## 2024-04-04 ENCOUNTER — PATIENT MESSAGE (OUTPATIENT)
Dept: FAMILY MEDICINE CLINIC | Age: 54
End: 2024-04-04

## 2024-04-04 DIAGNOSIS — R49.0 MUSCLE TENSION DYSPHONIA: Primary | ICD-10-CM

## 2024-04-04 DIAGNOSIS — J38.3 VOCAL CORD DYSFUNCTION: ICD-10-CM

## 2024-04-05 NOTE — TELEPHONE ENCOUNTER
From: Raj Cruz  To: Dr. Silvana Gutierrez  Sent: 4/4/2024 11:04 PM EDT  Subject: Diazepam    Can I get my diazepam refilled? I don't take it every day, but it's nice to have if I need it. Just to keep you updated, I had another basal cell removed from the inside of my right calf. It didn't scare me since we got it early, and I ki.da know the deal with basal cells now. It's just a pain trying to get it to heal when I have to walk on it. Oh well, it keeps life interesting. I hope you're doing well.

## 2024-04-10 RX ORDER — DIAZEPAM 2 MG/1
2 TABLET ORAL 2 TIMES DAILY
Qty: 60 TABLET | Refills: 0 | Status: SHIPPED | OUTPATIENT
Start: 2024-04-10 | End: 2024-05-10

## 2024-04-10 NOTE — TELEPHONE ENCOUNTER
1. Muscle tension dysphonia  -     diazePAM (VALIUM) 2 MG tablet; Take 1 tablet by mouth 2 times daily for 30 days. Max Daily Amount: 4 mg, Disp-60 tablet, R-0Normal  2. Vocal cord dysfunction  -     diazePAM (VALIUM) 2 MG tablet; Take 1 tablet by mouth 2 times daily for 30 days. Max Daily Amount: 4 mg, Disp-60 tablet, R-0Normal    Pdmp appropriate

## 2024-04-18 DIAGNOSIS — J38.3 VOCAL CORD DYSFUNCTION: ICD-10-CM

## 2024-04-18 DIAGNOSIS — R49.0 MUSCLE TENSION DYSPHONIA: ICD-10-CM

## 2024-04-18 RX ORDER — DIAZEPAM 2 MG/1
2 TABLET ORAL 2 TIMES DAILY
Qty: 60 TABLET | Refills: 0 | Status: SHIPPED | OUTPATIENT
Start: 2024-04-18 | End: 2024-05-18

## 2024-04-18 NOTE — TELEPHONE ENCOUNTER
zheng MCMILLAN yx [facility] Clinical Staff (supporting Silvana Gutierrez MD)1 hour ago (11:55 AM)       Hi, the diazepam prescription can't be transferred from [facility] to [facility].  I guess it hasn't been filled there before.  I tried to delete all other pharmacies from [facility], but I guess it didn't work.  Can you please send it to [facility] on [address]?

## 2024-05-08 RX ORDER — LEVOTHYROXINE SODIUM 0.03 MG/1
25 TABLET ORAL DAILY
Qty: 90 TABLET | Refills: 3 | Status: SHIPPED | OUTPATIENT
Start: 2024-05-08

## 2024-05-14 ENCOUNTER — OFFICE VISIT (OUTPATIENT)
Dept: FAMILY MEDICINE CLINIC | Age: 54
End: 2024-05-14

## 2024-05-14 VITALS
TEMPERATURE: 96.8 F | SYSTOLIC BLOOD PRESSURE: 148 MMHG | HEART RATE: 90 BPM | RESPIRATION RATE: 20 BRPM | HEIGHT: 67 IN | BODY MASS INDEX: 45.83 KG/M2 | DIASTOLIC BLOOD PRESSURE: 93 MMHG | WEIGHT: 292 LBS | OXYGEN SATURATION: 96 %

## 2024-05-14 DIAGNOSIS — I10 ESSENTIAL HYPERTENSION: ICD-10-CM

## 2024-05-14 DIAGNOSIS — J38.3 ADDUCTOR SPASMODIC DYSPHONIA: ICD-10-CM

## 2024-05-14 DIAGNOSIS — E55.9 VITAMIN D DEFICIENCY: ICD-10-CM

## 2024-05-14 DIAGNOSIS — R06.09 DYSPNEA ON EXERTION: Primary | ICD-10-CM

## 2024-05-14 DIAGNOSIS — R06.02 SHORTNESS OF BREATH: ICD-10-CM

## 2024-05-14 DIAGNOSIS — E78.00 PURE HYPERCHOLESTEROLEMIA: ICD-10-CM

## 2024-05-14 DIAGNOSIS — E03.9 HYPOTHYROIDISM, UNSPECIFIED TYPE: ICD-10-CM

## 2024-05-14 DIAGNOSIS — E66.01 CLASS 3 SEVERE OBESITY DUE TO EXCESS CALORIES WITH SERIOUS COMORBIDITY AND BODY MASS INDEX (BMI) OF 45.0 TO 49.9 IN ADULT (HCC): ICD-10-CM

## 2024-05-14 DIAGNOSIS — Z91.89 LACK OF MOTIVATION: ICD-10-CM

## 2024-05-14 DIAGNOSIS — Z12.5 SCREENING FOR PROSTATE CANCER: ICD-10-CM

## 2024-05-14 DIAGNOSIS — E66.01 MORBID OBESITY WITH BMI OF 40.0-44.9, ADULT (HCC): ICD-10-CM

## 2024-05-14 PROCEDURE — 3080F DIAST BP >= 90 MM HG: CPT | Performed by: STUDENT IN AN ORGANIZED HEALTH CARE EDUCATION/TRAINING PROGRAM

## 2024-05-14 PROCEDURE — G2211 COMPLEX E/M VISIT ADD ON: HCPCS | Performed by: STUDENT IN AN ORGANIZED HEALTH CARE EDUCATION/TRAINING PROGRAM

## 2024-05-14 PROCEDURE — 99214 OFFICE O/P EST MOD 30 MIN: CPT | Performed by: STUDENT IN AN ORGANIZED HEALTH CARE EDUCATION/TRAINING PROGRAM

## 2024-05-14 PROCEDURE — 3077F SYST BP >= 140 MM HG: CPT | Performed by: STUDENT IN AN ORGANIZED HEALTH CARE EDUCATION/TRAINING PROGRAM

## 2024-05-14 RX ORDER — BUPROPION HYDROCHLORIDE 150 MG/1
150 TABLET ORAL EVERY MORNING
Qty: 90 TABLET | Refills: 0 | Status: SHIPPED | OUTPATIENT
Start: 2024-05-14

## 2024-05-14 RX ORDER — ATORVASTATIN CALCIUM 20 MG/1
20 TABLET, FILM COATED ORAL DAILY
Qty: 90 TABLET | Refills: 1 | Status: SHIPPED | OUTPATIENT
Start: 2024-05-14

## 2024-05-14 RX ORDER — ASPIRIN 81 MG/1
81 TABLET ORAL DAILY
Qty: 90 TABLET | Refills: 1 | Status: SHIPPED | OUTPATIENT
Start: 2024-05-14

## 2024-05-14 ASSESSMENT — ENCOUNTER SYMPTOMS
WHEEZING: 0
STRIDOR: 0
SHORTNESS OF BREATH: 1
TROUBLE SWALLOWING: 0

## 2024-05-14 ASSESSMENT — PATIENT HEALTH QUESTIONNAIRE - PHQ9
2. FEELING DOWN, DEPRESSED OR HOPELESS: NOT AT ALL
SUM OF ALL RESPONSES TO PHQ QUESTIONS 1-9: 0
SUM OF ALL RESPONSES TO PHQ9 QUESTIONS 1 & 2: 0
1. LITTLE INTEREST OR PLEASURE IN DOING THINGS: NOT AT ALL
SUM OF ALL RESPONSES TO PHQ QUESTIONS 1-9: 0

## 2024-05-14 NOTE — PROGRESS NOTES
Raj Cruz (:  1970) is a 53 y.o. male, established patient follow up , here for evaluation of the following:  Hypertension (Check up )      Assessment & Plan   ASSESSMENT/PLAN      1. Dyspnea on exertion  Subacute, worsening, had initially thought was secondary to deconditioning however it is getting worse, will get stress testing done, started on aspirin and statin while we await results, he would like to hold off on cardiology referral until we know the results of the testing, does not sound respiratory in nature as he is not wheezing, coughing, no smoke exposure history of asthma however we can do respiratory workup in the future if he does not improve or stress testing is negative  -     Nuclear EXERCISE Stress Test; Future  -     atorvastatin (LIPITOR) 20 MG tablet; Take 1 tablet by mouth daily, Disp-90 tablet, R-1Normal  -     aspirin 81 MG EC tablet; Take 1 tablet by mouth daily, Disp-90 tablet, R-1Normal  2. Shortness of breath  -     Nuclear EXERCISE Stress Test; Future  3. Pure hypercholesterolemia  -     atorvastatin (LIPITOR) 20 MG tablet; Take 1 tablet by mouth daily, Disp-90 tablet, R-1Normal  -     aspirin 81 MG EC tablet; Take 1 tablet by mouth daily, Disp-90 tablet, R-1Normal  -     Lipid Panel; Future  4. Essential hypertension  Chronic, mildly elevated, he notes it is always elevated at the physician's office, he notes at home it is normal, he is due for labs in July  -     TSH; Future  -     Comprehensive Metabolic Panel; Future  -     CBC with Auto Differential; Future  5. Adductor spasmodic dysphonia  Chronic, is pretty significant today, he does plan to call [facility] for further Botox injections, uses benzodiazepine at times as well to help with speech  6. Hypothyroidism, unspecified type  Chronic, not Hashimoto's, possibly secondary to lifestyle, on low-dose of levothyroxine   7. Morbid obesity with BMI of 40.0-44.9, adult (HCC)  Chronic, not well-controlled, continued

## 2024-05-15 ENCOUNTER — TELEPHONE (OUTPATIENT)
Dept: CARDIOLOGY | Age: 54
End: 2024-05-15

## 2024-05-31 ENCOUNTER — TELEPHONE (OUTPATIENT)
Dept: CARDIOLOGY | Age: 54
End: 2024-05-31

## 2024-05-31 NOTE — TELEPHONE ENCOUNTER
Spoke w/ patient to confirm stress test for Monday, 6/3/24, starting at 0815.  Instructions given and medications reviewed.  Patient instructed no medication holds and no caffeine products for 12 hours prior to test.  All questions answered.

## 2024-06-03 ENCOUNTER — HOSPITAL ENCOUNTER (OUTPATIENT)
Dept: CARDIOLOGY | Age: 54
Discharge: HOME | End: 2024-06-05
Payer: COMMERCIAL

## 2024-06-03 VITALS — SYSTOLIC BLOOD PRESSURE: 150 MMHG | RESPIRATION RATE: 16 BRPM | DIASTOLIC BLOOD PRESSURE: 84 MMHG | HEART RATE: 76 BPM

## 2024-06-03 DIAGNOSIS — R06.09 DYSPNEA ON EXERTION: ICD-10-CM

## 2024-06-03 DIAGNOSIS — R06.02 SHORTNESS OF BREATH: ICD-10-CM

## 2024-06-03 LAB
STRESS BASELINE DIAS BP: 84 MMHG
STRESS BASELINE HR: 76 BPM
STRESS BASELINE SYS BP: 150 MMHG
STRESS ESTIMATED WORKLOAD: 6 METS
STRESS EXERCISE DUR MIN: 4 MIN
STRESS EXERCISE DUR SEC: 0 SEC
STRESS O2 SAT PEAK: 97 %
STRESS O2 SAT REST: 99 %
STRESS PEAK DIAS BP: 70 MMHG
STRESS PEAK SYS BP: 206 MMHG
STRESS PERCENT HR ACHIEVED: 91 %
STRESS POST PEAK HR: 153 BPM
STRESS RATE PRESSURE PRODUCT: NORMAL BPM*MMHG
STRESS TARGET HR: 168 BPM

## 2024-06-03 PROCEDURE — 3430000000 HC RX DIAGNOSTIC RADIOPHARMACEUTICAL: Performed by: INTERNAL MEDICINE

## 2024-06-03 PROCEDURE — 93018 CV STRESS TEST I&R ONLY: CPT | Performed by: INTERNAL MEDICINE

## 2024-06-03 PROCEDURE — A9502 TC99M TETROFOSMIN: HCPCS | Performed by: INTERNAL MEDICINE

## 2024-06-03 PROCEDURE — 78452 HT MUSCLE IMAGE SPECT MULT: CPT | Performed by: INTERNAL MEDICINE

## 2024-06-03 PROCEDURE — 2580000003 HC RX 258: Performed by: INTERNAL MEDICINE

## 2024-06-03 PROCEDURE — 93016 CV STRESS TEST SUPVJ ONLY: CPT | Performed by: INTERNAL MEDICINE

## 2024-06-03 PROCEDURE — 93017 CV STRESS TEST TRACING ONLY: CPT

## 2024-06-03 RX ORDER — SODIUM CHLORIDE 0.9 % (FLUSH) 0.9 %
10 SYRINGE (ML) INJECTION PRN
Status: DISCONTINUED | OUTPATIENT
Start: 2024-06-03 | End: 2024-06-06 | Stop reason: HOSPADM

## 2024-06-03 RX ADMIN — SODIUM CHLORIDE, PRESERVATIVE FREE 10 ML: 5 INJECTION INTRAVENOUS at 08:28

## 2024-06-03 RX ADMIN — TETROFOSMIN 35.8 MILLICURIE: 0.23 INJECTION, POWDER, LYOPHILIZED, FOR SOLUTION INTRAVENOUS at 09:43

## 2024-06-03 RX ADMIN — TETROFOSMIN 10.7 MILLICURIE: 0.23 INJECTION, POWDER, LYOPHILIZED, FOR SOLUTION INTRAVENOUS at 08:28

## 2024-06-03 RX ADMIN — SODIUM CHLORIDE, PRESERVATIVE FREE 10 ML: 5 INJECTION INTRAVENOUS at 09:43

## 2024-06-17 DIAGNOSIS — F41.9 ANXIETY: ICD-10-CM

## 2024-06-17 RX ORDER — LISINOPRIL 40 MG/1
40 TABLET ORAL DAILY
Qty: 90 TABLET | Refills: 3 | Status: SHIPPED | OUTPATIENT
Start: 2024-06-17

## 2024-06-17 RX ORDER — ESCITALOPRAM OXALATE 10 MG/1
10 TABLET ORAL DAILY
Qty: 90 TABLET | Refills: 1 | Status: SHIPPED | OUTPATIENT
Start: 2024-06-17

## 2024-06-24 ENCOUNTER — PATIENT MESSAGE (OUTPATIENT)
Dept: FAMILY MEDICINE CLINIC | Age: 54
End: 2024-06-24

## 2024-06-24 DIAGNOSIS — S81.801S WOUND OF RIGHT LOWER EXTREMITY, SEQUELA: Primary | ICD-10-CM

## 2024-06-24 NOTE — TELEPHONE ENCOUNTER
From: Raj Cruz  To: Dr. Silvana Gutierrez  Sent: 6/24/2024 3:52 AM EDT  Subject: Wound care    I was getting my leg treated (the basal cell excision site) at [facility]. They are great, but she can only scrape out slough for like 2 minutes because it will start bleeding and creat a new wound. So, it's 2 minutes then \"We'll see you back in a couple weeks.\" Then it's the same thing. I would rather not have to drive that far for something minor like that. Do you need to refer me to [facility] wound care, or can I call them on my own? [facility] has a wound care department.

## 2024-07-02 DIAGNOSIS — I10 ESSENTIAL HYPERTENSION: ICD-10-CM

## 2024-07-02 RX ORDER — AMLODIPINE BESYLATE 10 MG/1
10 TABLET ORAL DAILY
Qty: 90 TABLET | Refills: 3 | Status: SHIPPED | OUTPATIENT
Start: 2024-07-02

## 2024-07-12 ENCOUNTER — OFFICE VISIT (OUTPATIENT)
Dept: PODIATRY | Age: 54
End: 2024-07-12

## 2024-07-12 VITALS — HEIGHT: 67 IN | WEIGHT: 292 LBS | BODY MASS INDEX: 45.83 KG/M2

## 2024-07-12 DIAGNOSIS — I87.2 VENOUS STASIS DERMATITIS OF RIGHT LOWER EXTREMITY: Primary | ICD-10-CM

## 2024-07-12 DIAGNOSIS — R60.0 LOCALIZED EDEMA: ICD-10-CM

## 2024-07-12 DIAGNOSIS — R26.2 DIFFICULTY WALKING: ICD-10-CM

## 2024-07-12 DIAGNOSIS — I87.2 VENOUS INSUFFICIENCY (CHRONIC) (PERIPHERAL): ICD-10-CM

## 2024-07-12 NOTE — PROGRESS NOTES
New patient here for right lower extremity wound. Patient has skin cancer removed from that area and several of the sutures broke open. Patient has been seeing Doctor at [facility] but he wanted to establish with  doctor locally. Silvana Gutierrez MD last visit 5/14/2024   .esign    
Rfl: 1    albuterol sulfate HFA (VENTOLIN HFA) 108 (90 Base) MCG/ACT inhaler, Inhale 2 puffs into the lungs 4 times daily as needed for Wheezing, Disp: 18 g, Rfl: 5    Multiple Vitamins-Minerals (MULTIVITAMIN ADULT PO), Take 1 tablet by mouth daily, Disp: , Rfl:     Omega-3 Fatty Acids (OMEGA-3 CF PO), Take by mouth daily, Disp: , Rfl:     Allergies:  No Known Allergies    Vitals:    07/12/24 1015   Weight: 132.5 kg (292 lb)   Height: 1.702 m (5' 7\")        Past Medical History:   Diagnosis Date    Hypertension     Obesity     263 #    Thyroid disease      Family History   Problem Relation Age of Onset    Cancer Father         Prostate    Stroke Maternal Grandfather     Cancer Paternal Uncle         Prostate    Cancer Paternal Uncle         Prostate     Past Surgical History:   Procedure Laterality Date    CHOLECYSTECTOMY, LAPAROSCOPIC N/A 4/30/2019    LAPAROSCOPIC CHOLECYSTECTOMY POSSIBLE GRAM, POSSIBLE OPEN performed by Rafy Cade MD at [facility] OR    WISDOM TOOTH EXTRACTION       Social History     Tobacco Use    Smoking status: Never    Smokeless tobacco: Never   Substance Use Topics    Alcohol use: Not Currently     Comment: Rarely    Drug use: No           Diagnostic studies:    No results found.      Procedures:    An unna boot  compressive wrap was applied to the right lower extremity. It's purpose is to  decrease  the amount of edema present, and to allow proper healing of the soft tissues.  The patient was instructed to keep the unna boot clean, dry and intact until the next follow up visit. The patient was instructed to look for signs of redness, irritation, blistering and pain.  If these or any other symptoms were to develop, they were advised to contact the office immediately for reevaluation.      Exam:  VASCULAR: Pedal pulses palpable right lower extremity.  Capillary refill time less than 3 seconds digits 1 through 5 right foot.  Hair growth present right lower extremity  NEUROLOGICAL: Epicritic

## 2024-07-15 ENCOUNTER — OFFICE VISIT (OUTPATIENT)
Dept: PODIATRY | Age: 54
End: 2024-07-15
Payer: COMMERCIAL

## 2024-07-15 VITALS — BODY MASS INDEX: 45.83 KG/M2 | HEIGHT: 67 IN | WEIGHT: 292 LBS

## 2024-07-15 DIAGNOSIS — I87.2 VENOUS INSUFFICIENCY (CHRONIC) (PERIPHERAL): ICD-10-CM

## 2024-07-15 DIAGNOSIS — I87.2 VENOUS STASIS DERMATITIS OF RIGHT LOWER EXTREMITY: Primary | ICD-10-CM

## 2024-07-15 PROCEDURE — 99999 PR OFFICE/OUTPT VISIT,PROCEDURE ONLY: CPT | Performed by: PODIATRIST

## 2024-07-15 PROCEDURE — 29580 STRAPPING UNNA BOOT: CPT | Performed by: PODIATRIST

## 2024-07-15 NOTE — PROGRESS NOTES
Patient here for left lower extremity wound, unna boot. Patient thinks the wound looks less red. Silvana Gutierrez MD last visit 5/14/2024   Electronically signed by Maribell Barrett LPN on 7/15/2024 at 10:49 AM

## 2024-07-15 NOTE — PROGRESS NOTES
7/15/24     Raj Cruz    : 1970   Sex: male    Age: 53 y.o.    Patient's PCP/Provider is:  Silvana Gutierrez MD    Subjective:  Patient is seen today for follow-up regarding care continued care chronic venous insufficiency and stasis issues left lower extremity.  Patient tolerated the compression dressing applied last visit.  Patient has noticed diminishment in swelling and redness to the periwound area.  No other additional abnormalities noted at this time.    Chief Complaint   Patient presents with    Wound Check     Wound of left lower extremity       ROS:  Const: Positives and pertinent negatives as per HPI.    Musculo: Denies symptoms other than stated above.  Neuro: Denies symptoms other than stated above.  Skin: Denies symptoms other than stated above.    Current Medications:    Current Outpatient Medications:     amLODIPine (NORVASC) 10 MG tablet, Take 1 tablet by mouth daily, Disp: 90 tablet, Rfl: 3    lisinopril (PRINIVIL;ZESTRIL) 40 MG tablet, Take 1 tablet by mouth daily, Disp: 90 tablet, Rfl: 3    escitalopram (LEXAPRO) 10 MG tablet, Take 1 tablet by mouth daily, Disp: 90 tablet, Rfl: 1    atorvastatin (LIPITOR) 20 MG tablet, Take 1 tablet by mouth daily, Disp: 90 tablet, Rfl: 1    aspirin 81 MG EC tablet, Take 1 tablet by mouth daily, Disp: 90 tablet, Rfl: 1    buPROPion (WELLBUTRIN XL) 150 MG extended release tablet, Take 1 tablet by mouth every morning, Disp: 90 tablet, Rfl: 0    levothyroxine (SYNTHROID) 25 MCG tablet, Take 1 tablet by mouth Daily, Disp: 90 tablet, Rfl: 3    ketoconazole (NIZORAL) 2 % cream, Apply topically daily., Disp: 30 g, Rfl: 1    albuterol sulfate HFA (VENTOLIN HFA) 108 (90 Base) MCG/ACT inhaler, Inhale 2 puffs into the lungs 4 times daily as needed for Wheezing, Disp: 18 g, Rfl: 5    Multiple Vitamins-Minerals (MULTIVITAMIN ADULT PO), Take 1 tablet by mouth daily, Disp: , Rfl:     Omega-3 Fatty Acids (OMEGA-3 CF PO), Take by mouth daily, Disp: , Rfl:

## 2024-07-19 ENCOUNTER — OFFICE VISIT (OUTPATIENT)
Dept: PODIATRY | Age: 54
End: 2024-07-19
Payer: COMMERCIAL

## 2024-07-19 VITALS — HEIGHT: 67 IN | WEIGHT: 292 LBS | BODY MASS INDEX: 45.83 KG/M2

## 2024-07-19 DIAGNOSIS — I87.2 VENOUS STASIS DERMATITIS OF RIGHT LOWER EXTREMITY: Primary | ICD-10-CM

## 2024-07-19 DIAGNOSIS — I87.2 VENOUS INSUFFICIENCY (CHRONIC) (PERIPHERAL): ICD-10-CM

## 2024-07-19 PROCEDURE — 99213 OFFICE O/P EST LOW 20 MIN: CPT | Performed by: PODIATRIST

## 2024-07-19 RX ORDER — TRIAMCINOLONE ACETONIDE 1 MG/G
CREAM TOPICAL
Qty: 90 G | Refills: 2 | Status: SHIPPED | OUTPATIENT
Start: 2024-07-19

## 2024-07-19 NOTE — PROGRESS NOTES
Patient is in today for 1 week follow up of right leg wound. Pcp is Silvana Gutierrez MD  Last ov 5/14/24  
Allergies    Vitals:    07/19/24 1103   Weight: 132.5 kg (292 lb)   Height: 1.702 m (5' 7.01\")       Exam:  NVS unchanged.  Stasis dermatitis issues resolved right lower extremity.  Edema stable bilateral lower extremities.  Adequate ROM noted right lower extremity.      Diagnostic Studies:     No results found.      Procedures:    None    Plan Per Assessment  Raj was seen today for wound check.    Diagnoses and all orders for this visit:    Venous stasis dermatitis of right lower extremity  -     triamcinolone (KENALOG) 0.1 % cream; Apply topically 2 times daily.    Venous insufficiency (chronic) (peripheral)      Evaluation and management  Discussed continued care options with patient today to prevent reoccurrence.    Rx given today for Kenalog cream to be applied as directed.    Patient will be followed up if any additional Podiatric issues arise.        Seen By:    Blade Munguia Jr, DPM    Electronically signed by Blade Munguia Jr, DPM on 7/19/2024 at 5:13 PM    This note was created using voice recognition software.  The note was reviewed however may contain grammatical errors.

## 2024-08-16 DIAGNOSIS — Z91.89 LACK OF MOTIVATION: ICD-10-CM

## 2024-08-16 DIAGNOSIS — E66.01 CLASS 3 SEVERE OBESITY DUE TO EXCESS CALORIES WITH SERIOUS COMORBIDITY AND BODY MASS INDEX (BMI) OF 45.0 TO 49.9 IN ADULT (HCC): ICD-10-CM

## 2024-08-16 DIAGNOSIS — R06.09 DYSPNEA ON EXERTION: ICD-10-CM

## 2024-08-16 DIAGNOSIS — E78.00 PURE HYPERCHOLESTEROLEMIA: ICD-10-CM

## 2024-08-16 RX ORDER — ASPIRIN 81 MG/1
81 TABLET ORAL DAILY
Qty: 90 TABLET | Refills: 3 | Status: SHIPPED | OUTPATIENT
Start: 2024-08-16

## 2024-08-16 RX ORDER — BUPROPION HYDROCHLORIDE 150 MG/1
150 TABLET ORAL EVERY MORNING
Qty: 90 TABLET | Refills: 3 | Status: SHIPPED | OUTPATIENT
Start: 2024-08-16

## 2024-08-19 ENCOUNTER — PATIENT MESSAGE (OUTPATIENT)
Dept: FAMILY MEDICINE CLINIC | Age: 54
End: 2024-08-19

## 2024-08-19 DIAGNOSIS — I10 ESSENTIAL HYPERTENSION: ICD-10-CM

## 2024-08-19 DIAGNOSIS — R40.0 DAYTIME SLEEPINESS: Primary | ICD-10-CM

## 2024-08-19 DIAGNOSIS — E66.01 MORBID OBESITY WITH BMI OF 40.0-44.9, ADULT (HCC): ICD-10-CM

## 2024-08-19 DIAGNOSIS — Z91.89 LACK OF MOTIVATION: ICD-10-CM

## 2024-08-19 DIAGNOSIS — R06.83 SNORING: ICD-10-CM

## 2024-08-21 NOTE — TELEPHONE ENCOUNTER
See MyChart message    1. Daytime sleepiness  -     [facility]  2. Snoring  -     [facility]  3. Lack of motivation  -     [facility]  4. Morbid obesity with BMI of 40.0-44.9, adult (HCC)  -     [facility]  5. Essential hypertension  -     [facility]

## 2024-09-03 DIAGNOSIS — F41.9 ANXIETY: ICD-10-CM

## 2024-09-03 DIAGNOSIS — R49.0 MUSCLE TENSION DYSPHONIA: ICD-10-CM

## 2024-09-03 DIAGNOSIS — J38.3 VOCAL CORD DYSFUNCTION: ICD-10-CM

## 2024-09-06 RX ORDER — DIAZEPAM 2 MG
2 TABLET ORAL EVERY 6 HOURS PRN
Qty: 30 TABLET | Refills: 0 | Status: SHIPPED | OUTPATIENT
Start: 2024-09-06 | End: 2024-09-16

## 2024-09-06 NOTE — TELEPHONE ENCOUNTER
1. Muscle tension dysphonia  -     diazePAM (VALIUM) 2 MG tablet; Take 1 tablet by mouth every 6 hours as needed for Anxiety for up to 10 days. Max Daily Amount: 6 mg, Disp-30 tablet, R-0Normal  2. Vocal cord dysfunction  -     diazePAM (VALIUM) 2 MG tablet; Take 1 tablet by mouth every 6 hours as needed for Anxiety for up to 10 days. Max Daily Amount: 6 mg, Disp-30 tablet, R-0Normal  3. Anxiety  -     diazePAM (VALIUM) 2 MG tablet; Take 1 tablet by mouth every 6 hours as needed for Anxiety for up to 10 days. Max Daily Amount: 6 mg, Disp-30 tablet, R-0Normal    PDMP appropriate

## 2024-11-24 ENCOUNTER — PATIENT MESSAGE (OUTPATIENT)
Dept: FAMILY MEDICINE CLINIC | Age: 54
End: 2024-11-24

## 2024-11-24 DIAGNOSIS — R35.0 FREQUENCY OF URINATION: Primary | ICD-10-CM

## 2024-11-25 NOTE — TELEPHONE ENCOUNTER
Eileen, can you help this patient get scheduled for an appointment please, does not have to be in the next few weeks, he is going to get some labs done which we can review first

## 2024-11-26 ENCOUNTER — HOSPITAL ENCOUNTER (OUTPATIENT)
Age: 54
Discharge: HOME | End: 2024-11-26
Payer: COMMERCIAL

## 2024-11-26 DIAGNOSIS — R35.0 FREQUENCY OF URINATION: ICD-10-CM

## 2024-11-26 LAB
25(OH)D3 SERPL-MCNC: 40.1 NG/ML (ref 30–100)
ALBUMIN SERPL-MCNC: 4 G/DL (ref 3.5–5.2)
ALP SERPL-CCNC: 127 U/L (ref 40–129)
ALT SERPL-CCNC: 17 U/L (ref 0–40)
ANION GAP SERPL CALCULATED.3IONS-SCNC: 10 MMOL/L (ref 7–16)
AST SERPL-CCNC: 20 U/L (ref 0–39)
BASOPHILS # BLD: 0.11 K/UL (ref 0–0.2)
BASOPHILS NFR BLD: 1 % (ref 0–2)
BILIRUB SERPL-MCNC: 0.5 MG/DL (ref 0–1.2)
BILIRUB UR QL STRIP: NEGATIVE
BUN SERPL-MCNC: 12 MG/DL (ref 6–20)
CALCIUM SERPL-MCNC: 9.7 MG/DL (ref 8.6–10.2)
CHLORIDE SERPL-SCNC: 100 MMOL/L (ref 98–107)
CHOLEST SERPL-MCNC: 184 MG/DL
CLARITY UR: CLEAR
CO2 SERPL-SCNC: 34 MMOL/L (ref 22–29)
COLOR UR: YELLOW
CREAT SERPL-MCNC: 0.8 MG/DL (ref 0.7–1.2)
EOSINOPHIL # BLD: 0.39 K/UL (ref 0.05–0.5)
EOSINOPHILS RELATIVE PERCENT: 4 % (ref 0–6)
ERYTHROCYTE [DISTWIDTH] IN BLOOD BY AUTOMATED COUNT: 14 % (ref 11.5–15)
GFR, ESTIMATED: >90 ML/MIN/1.73M2
GLUCOSE SERPL-MCNC: 146 MG/DL (ref 74–99)
GLUCOSE UR STRIP-MCNC: NEGATIVE MG/DL
HCT VFR BLD AUTO: 42.6 % (ref 37–54)
HDLC SERPL-MCNC: 41 MG/DL
HGB BLD-MCNC: 14.2 G/DL (ref 12.5–16.5)
HGB UR QL STRIP.AUTO: NEGATIVE
IMM GRANULOCYTES # BLD AUTO: 0.08 K/UL (ref 0–0.58)
IMM GRANULOCYTES NFR BLD: 1 % (ref 0–5)
KETONES UR STRIP-MCNC: NEGATIVE MG/DL
LDLC SERPL CALC-MCNC: 124 MG/DL
LEUKOCYTE ESTERASE UR QL STRIP: NEGATIVE
LYMPHOCYTES NFR BLD: 2.09 K/UL (ref 1.5–4)
LYMPHOCYTES RELATIVE PERCENT: 21 % (ref 20–42)
MCH RBC QN AUTO: 26 PG (ref 26–35)
MCHC RBC AUTO-ENTMCNC: 33.3 G/DL (ref 32–34.5)
MCV RBC AUTO: 78 FL (ref 80–99.9)
MONOCYTES NFR BLD: 0.54 K/UL (ref 0.1–0.95)
MONOCYTES NFR BLD: 6 % (ref 2–12)
NEUTROPHILS NFR BLD: 67 % (ref 43–80)
NEUTS SEG NFR BLD: 6.65 K/UL (ref 1.8–7.3)
NITRITE UR QL STRIP: NEGATIVE
PH UR STRIP: 6.5 [PH] (ref 5–9)
PLATELET # BLD AUTO: 319 K/UL (ref 130–450)
PMV BLD AUTO: 8.7 FL (ref 7–12)
POTASSIUM SERPL-SCNC: 3.5 MMOL/L (ref 3.5–5)
PROT SERPL-MCNC: 7.7 G/DL (ref 6.4–8.3)
PROT UR STRIP-MCNC: 30 MG/DL
PSA SERPL-MCNC: 0.41 NG/ML (ref 0–4)
RBC # BLD AUTO: 5.46 M/UL (ref 3.8–5.8)
RBC #/AREA URNS HPF: ABNORMAL /HPF
SODIUM SERPL-SCNC: 144 MMOL/L (ref 132–146)
SP GR UR STRIP: 1.02 (ref 1–1.03)
TRIGL SERPL-MCNC: 94 MG/DL
TSH SERPL DL<=0.05 MIU/L-ACNC: 2.68 UIU/ML (ref 0.27–4.2)
UROBILINOGEN UR STRIP-ACNC: 0.2 EU/DL (ref 0–1)
VLDLC SERPL CALC-MCNC: 19 MG/DL
WBC #/AREA URNS HPF: ABNORMAL /HPF
WBC OTHER # BLD: 9.9 K/UL (ref 4.5–11.5)

## 2024-11-26 PROCEDURE — 80061 LIPID PANEL: CPT

## 2024-11-26 PROCEDURE — 84443 ASSAY THYROID STIM HORMONE: CPT

## 2024-11-26 PROCEDURE — 80053 COMPREHEN METABOLIC PANEL: CPT

## 2024-11-26 PROCEDURE — 36415 COLL VENOUS BLD VENIPUNCTURE: CPT

## 2024-11-26 PROCEDURE — 82306 VITAMIN D 25 HYDROXY: CPT

## 2024-11-26 PROCEDURE — 81001 URINALYSIS AUTO W/SCOPE: CPT

## 2024-11-26 PROCEDURE — 85025 COMPLETE CBC W/AUTO DIFF WBC: CPT

## 2024-11-26 PROCEDURE — G0103 PSA SCREENING: HCPCS

## 2024-12-02 ENCOUNTER — OFFICE VISIT (OUTPATIENT)
Dept: FAMILY MEDICINE CLINIC | Age: 54
End: 2024-12-02

## 2024-12-02 VITALS
TEMPERATURE: 98.8 F | RESPIRATION RATE: 17 BRPM | DIASTOLIC BLOOD PRESSURE: 90 MMHG | HEART RATE: 95 BPM | OXYGEN SATURATION: 97 % | HEIGHT: 67 IN | SYSTOLIC BLOOD PRESSURE: 158 MMHG | WEIGHT: 291 LBS | BODY MASS INDEX: 45.67 KG/M2

## 2024-12-02 DIAGNOSIS — E66.01 MORBID OBESITY WITH BMI OF 40.0-44.9, ADULT: ICD-10-CM

## 2024-12-02 DIAGNOSIS — Z91.89 LACK OF MOTIVATION: ICD-10-CM

## 2024-12-02 DIAGNOSIS — R73.09 ELEVATED HEMOGLOBIN A1C: ICD-10-CM

## 2024-12-02 DIAGNOSIS — R06.02 SHORTNESS OF BREATH: ICD-10-CM

## 2024-12-02 DIAGNOSIS — R45.4 ANGER: ICD-10-CM

## 2024-12-02 DIAGNOSIS — R80.9 PROTEINURIA, UNSPECIFIED TYPE: ICD-10-CM

## 2024-12-02 DIAGNOSIS — I10 ESSENTIAL HYPERTENSION: ICD-10-CM

## 2024-12-02 DIAGNOSIS — J38.3 VOCAL CORD DYSFUNCTION: ICD-10-CM

## 2024-12-02 DIAGNOSIS — Z53.20 COLONOSCOPY REFUSED: ICD-10-CM

## 2024-12-02 DIAGNOSIS — F33.1 MODERATE EPISODE OF RECURRENT MAJOR DEPRESSIVE DISORDER (HCC): ICD-10-CM

## 2024-12-02 DIAGNOSIS — F41.9 ANXIETY: Primary | ICD-10-CM

## 2024-12-02 LAB
CREATININE URINE POCT: 10
HBA1C MFR BLD: 6.6 %
MICROALBUMIN/CREAT 24H UR: 150 MG/DL
MICROALBUMIN/CREAT UR-RTO: >300 MG/G

## 2024-12-02 RX ORDER — VENLAFAXINE HYDROCHLORIDE 75 MG/1
75 CAPSULE, EXTENDED RELEASE ORAL DAILY
Qty: 90 CAPSULE | Refills: 0 | Status: SHIPPED | OUTPATIENT
Start: 2024-12-02

## 2024-12-02 RX ORDER — BACLOFEN 10 MG/1
10 TABLET ORAL 2 TIMES DAILY
COMMUNITY
Start: 2024-09-26 | End: 2024-12-02

## 2024-12-02 SDOH — ECONOMIC STABILITY: INCOME INSECURITY: HOW HARD IS IT FOR YOU TO PAY FOR THE VERY BASICS LIKE FOOD, HOUSING, MEDICAL CARE, AND HEATING?: NOT VERY HARD

## 2024-12-02 SDOH — ECONOMIC STABILITY: FOOD INSECURITY: WITHIN THE PAST 12 MONTHS, YOU WORRIED THAT YOUR FOOD WOULD RUN OUT BEFORE YOU GOT MONEY TO BUY MORE.: NEVER TRUE

## 2024-12-02 SDOH — ECONOMIC STABILITY: FOOD INSECURITY: WITHIN THE PAST 12 MONTHS, THE FOOD YOU BOUGHT JUST DIDN'T LAST AND YOU DIDN'T HAVE MONEY TO GET MORE.: NEVER TRUE

## 2024-12-02 ASSESSMENT — ENCOUNTER SYMPTOMS
SHORTNESS OF BREATH: 0
ABDOMINAL PAIN: 0
TROUBLE SWALLOWING: 0

## 2024-12-02 NOTE — PROGRESS NOTES
Outpatient Medications   Medication Sig Dispense Refill    venlafaxine (EFFEXOR XR) 75 MG extended release capsule Take 1 capsule by mouth daily 90 capsule 0    amLODIPine (NORVASC) 10 MG tablet Take 1 tablet by mouth daily 90 tablet 3    lisinopril (PRINIVIL;ZESTRIL) 40 MG tablet Take 1 tablet by mouth daily 90 tablet 3    levothyroxine (SYNTHROID) 25 MCG tablet Take 1 tablet by mouth Daily 90 tablet 3    Multiple Vitamins-Minerals (MULTIVITAMIN ADULT PO) Take 1 tablet by mouth daily       No current facility-administered medications for this visit.      Allergies: Patient has no known allergies.     Review of Systems   Constitutional:  Negative for activity change, appetite change, fatigue, fever and unexpected weight change.   HENT:  Negative for trouble swallowing.    Eyes:  Negative for visual disturbance.   Respiratory:  Negative for shortness of breath.    Cardiovascular:  Negative for chest pain.   Gastrointestinal:  Negative for abdominal pain.   Musculoskeletal:  Negative for arthralgias.   Neurological:  Negative for weakness, light-headedness and headaches.   Psychiatric/Behavioral:  Positive for decreased concentration and dysphoric mood. Negative for confusion and sleep disturbance. The patient is nervous/anxious.    All other systems reviewed and are negative.         Objective   BP (!) 158/90 Comment: MANUAL  Pulse 95   Temp 98.8 °F (37.1 °C) (Oral)   Resp 17   Ht 1.702 m (5' 7.01\")   Wt 132 kg (291 lb)   SpO2 97%   BMI 45.57 kg/m²       Lab Results   Component Value Date    LABA1C 6.6 12/02/2024    LABA1C 6.0 (H) 10/31/2023     Lab Results   Component Value Date    CHOL 184 11/26/2024    CHOL 188 07/10/2023    CHOL 170 03/16/2022     Lab Results   Component Value Date    TRIG 94 11/26/2024    TRIG 215 (H) 07/10/2023    TRIG 111 03/16/2022     Lab Results   Component Value Date    HDL 41 11/26/2024    HDL 45 07/10/2023    HDL 40 03/16/2022     No components found for: \"LDLCHOLESTEROL\",

## 2024-12-28 DIAGNOSIS — J38.3 VOCAL CORD DYSFUNCTION: ICD-10-CM

## 2024-12-28 DIAGNOSIS — R49.0 MUSCLE TENSION DYSPHONIA: ICD-10-CM

## 2024-12-30 RX ORDER — DIAZEPAM 2 MG/1
2 TABLET ORAL EVERY 12 HOURS PRN
Qty: 60 TABLET | Refills: 0 | Status: SHIPPED | OUTPATIENT
Start: 2024-12-30 | End: 2025-03-30

## 2024-12-30 NOTE — TELEPHONE ENCOUNTER
1. Muscle tension dysphonia  -     diazePAM (VALIUM) 2 MG tablet; Take 1 tablet by mouth every 12 hours as needed for Anxiety for up to 90 days. Max Daily Amount: 4 mg, Disp-60 tablet, R-0Normal  2. Vocal cord dysfunction  -     diazePAM (VALIUM) 2 MG tablet; Take 1 tablet by mouth every 12 hours as needed for Anxiety for up to 90 days. Max Daily Amount: 4 mg, Disp-60 tablet, R-0Normal    PDMP appropriate

## 2025-02-28 DIAGNOSIS — F41.9 ANXIETY: ICD-10-CM

## 2025-02-28 DIAGNOSIS — F33.1 MODERATE EPISODE OF RECURRENT MAJOR DEPRESSIVE DISORDER (HCC): ICD-10-CM

## 2025-02-28 RX ORDER — VENLAFAXINE HYDROCHLORIDE 75 MG/1
75 CAPSULE, EXTENDED RELEASE ORAL DAILY
Qty: 90 CAPSULE | Refills: 0 | OUTPATIENT
Start: 2025-02-28

## 2025-02-28 RX ORDER — VENLAFAXINE HYDROCHLORIDE 75 MG/1
75 CAPSULE, EXTENDED RELEASE ORAL DAILY
Qty: 90 CAPSULE | Refills: 3 | Status: SHIPPED | OUTPATIENT
Start: 2025-02-28

## 2025-02-28 NOTE — TELEPHONE ENCOUNTER
Name of Medication(s) Requested: EFFEXOR XR    Requested Prescriptions     Pending Prescriptions Disp Refills    venlafaxine (EFFEXOR XR) 75 MG extended release capsule 90 capsule 0     Sig: Take 1 capsule by mouth daily       Medication is on current medication list Yes    Dosage and directions were verified? Yes    Quantity verified: 90 day supply     Pharmacy Verified?  Yes    Last Appointment:  12/2/2024    Future appts:  No future appointments.     (If no appt send self scheduling link. .REFILLAPPT)  Scheduling request sent?     [] Yes  [] No    Does patient need updated?  [] Yes  [] No

## (undated) DEVICE — KIT,ANTI FOG,W/SPONGE & FLUID,SOFT PACK: Brand: MEDLINE

## (undated) DEVICE — TROCAR ENDOSCP L100MM DIA12MM BLDELSS OBT RADLUC STBL SL

## (undated) DEVICE — TOTAL TRAY, DB, 100% SILI FOLEY, 16FR 10: Brand: MEDLINE

## (undated) DEVICE — WARMER SCP LAP

## (undated) DEVICE — INSUFFLATION TUBING SET WITH FILTER, FUNNEL CONNECTOR AND LUER LOCK: Brand: JOSNOE MEDICAL INC

## (undated) DEVICE — CAMERA STRYKER 1488 HD GEN

## (undated) DEVICE — LAPAROSCOPIC SCISSORS: Brand: EPIX LAPAROSCOPIC SCISSORS

## (undated) DEVICE — NEEDLE INSUF L120MM DIA2MM DISP FOR PNEUMOPERI ENDOPATH

## (undated) DEVICE — GOWN,SIRUS,FABRNF,XL,20/CS: Brand: MEDLINE

## (undated) DEVICE — INTENDED FOR TISSUE SEPARATION, AND OTHER PROCEDURES THAT REQUIRE A SHARP SURGICAL BLADE TO PUNCTURE OR CUT.: Brand: BARD-PARKER ® STAINLESS STEEL BLADES

## (undated) DEVICE — GLOVE ORANGE PI 7 1/2   MSG9075

## (undated) DEVICE — PMI DISPOSABLE PUNCTURE CLOSURE DEVICE / SUTURE GRASPER: Brand: PMI

## (undated) DEVICE — CONTROL SYRINGE LUER-LOCK TIP: Brand: MONOJECT

## (undated) DEVICE — STANDARD HYPODERMIC NEEDLE,ALUMINUM HUB: Brand: MONOJECT

## (undated) DEVICE — SYRINGE 20ML LL S/C 50

## (undated) DEVICE — SET ENDO INSTR LAPAROSCOPIC STGENLAP

## (undated) DEVICE — PATIENT RETURN ELECTRODE, SINGLE-USE, CONTACT QUALITY MONITORING, ADULT, WITH 9FT CORD, FOR PATIENTS WEIGING OVER 33LBS. (15KG): Brand: MEGADYNE

## (undated) DEVICE — TIBURON GENERAL ENDOSCOPY DRAPE: Brand: CONVERTORS

## (undated) DEVICE — SURGICAL PROCEDURE PACK BASIC

## (undated) DEVICE — 4-PORT MANIFOLD: Brand: NEPTUNE 2

## (undated) DEVICE — TROCAR ENDOSCP L100MM DIA5MM BLDELSS STBL SL OBT RADLUC

## (undated) DEVICE — 1.5L THIN WALL CAN: Brand: CRD

## (undated) DEVICE — Z DISCONTINUED NO SUB IDED BAG SPEC RETRV M C240ML MOUTH 7.3MM L17CM SHFT 10MM NYL EZEE

## (undated) DEVICE — SKIN AFFIX SURG ADHESIVE 72/CS 0.55ML: Brand: MEDLINE

## (undated) DEVICE — TOWEL,OR,DSP,ST,BLUE,STD,6/PK,12PK/CS: Brand: MEDLINE

## (undated) DEVICE — CHLORAPREP 26ML ORANGE

## (undated) DEVICE — Z INACTIVE USE 2660664 SOLUTION IRRIG 3000ML 0.9% SOD CHL USP UROMATIC PLAS CONT

## (undated) DEVICE — AGENT HEMSTAT W4XL4IN OXIDIZED REGENERATED CELOS STRUCTURED

## (undated) DEVICE — APPLIER CLP L SHFT DIA12MM 20 ROT MULT LIGACLP

## (undated) DEVICE — GLOVE ORANGE PI 8   MSG9080

## (undated) DEVICE — SET INSTRUMENT LAP I

## (undated) DEVICE — PUMP SUC IRR TBNG L10FT W/ HNDPC ASSEMB STRYKEFLOW 2